# Patient Record
Sex: MALE | Race: BLACK OR AFRICAN AMERICAN | NOT HISPANIC OR LATINO | Employment: FULL TIME | URBAN - METROPOLITAN AREA
[De-identification: names, ages, dates, MRNs, and addresses within clinical notes are randomized per-mention and may not be internally consistent; named-entity substitution may affect disease eponyms.]

---

## 2019-05-03 ENCOUNTER — OFFICE VISIT (OUTPATIENT)
Dept: URGENT CARE | Facility: CLINIC | Age: 44
End: 2019-05-03
Payer: COMMERCIAL

## 2019-05-03 VITALS
OXYGEN SATURATION: 100 % | BODY MASS INDEX: 36.96 KG/M2 | DIASTOLIC BLOOD PRESSURE: 78 MMHG | HEART RATE: 72 BPM | TEMPERATURE: 98 F | SYSTOLIC BLOOD PRESSURE: 124 MMHG | WEIGHT: 230 LBS | HEIGHT: 66 IN

## 2019-05-03 DIAGNOSIS — S69.91XA INJURY OF RIGHT INDEX FINGER, INITIAL ENCOUNTER: Primary | ICD-10-CM

## 2019-05-03 PROCEDURE — 99214 OFFICE O/P EST MOD 30 MIN: CPT | Mod: S$GLB,,, | Performed by: NURSE PRACTITIONER

## 2019-05-03 PROCEDURE — 99214 PR OFFICE/OUTPT VISIT, EST, LEVL IV, 30-39 MIN: ICD-10-PCS | Mod: S$GLB,,, | Performed by: NURSE PRACTITIONER

## 2019-05-03 PROCEDURE — 73130 X-RAY EXAM OF HAND: CPT | Mod: FY,RT,S$GLB, | Performed by: RADIOLOGY

## 2019-05-03 PROCEDURE — 73130 XR HAND COMPLETE 3 VIEW RIGHT: ICD-10-PCS | Mod: FY,RT,S$GLB, | Performed by: RADIOLOGY

## 2019-05-03 PROCEDURE — 3008F BODY MASS INDEX DOCD: CPT | Mod: CPTII,S$GLB,, | Performed by: NURSE PRACTITIONER

## 2019-05-03 PROCEDURE — 3008F PR BODY MASS INDEX (BMI) DOCUMENTED: ICD-10-PCS | Mod: CPTII,S$GLB,, | Performed by: NURSE PRACTITIONER

## 2019-05-03 RX ORDER — METHOCARBAMOL 750 MG/1
TABLET, FILM COATED ORAL
Refills: 0 | COMMUNITY
Start: 2019-03-22

## 2019-05-03 RX ORDER — MELOXICAM 15 MG/1
TABLET ORAL
Refills: 0 | Status: ON HOLD | COMMUNITY
Start: 2019-04-18 | End: 2019-10-23 | Stop reason: HOSPADM

## 2019-05-03 NOTE — LETTER
May 3, 2019      [facility]  [address] [place] [address]  Phone: [phone]  Fax: [phone]       Patient: Mateo George   YOB: 1975  Date of Visit: 05/03/2019    To Whom It May Concern:    Jed George  was at [facility] on 05/03/2019. He may return to work/school on 05/04/19 with no restrictions. If you have any questions or concerns, or if I can be of further assistance, please do not hesitate to contact me.    Sincerely,      Colette June NP

## 2019-05-03 NOTE — PROGRESS NOTES
"Subjective:       Patient ID: Mateo George is a 43 y.o. male.    Vitals:  height is 5' 6" (1.676 m) and weight is 104.3 kg (230 lb). His oral temperature is 98.2 °F (36.8 °C). His blood pressure is 124/78 and his pulse is 72. His oxygen saturation is 100%.     Chief Complaint: Trauma      The patient presents to the clinic today with complaints of a injury to his right index finger.  Patient claims that he slammed in a car door accidentally yesterday.  Patient was at work earlier today when he hit it on a box and experienced pain in the distal tip of the 2nd finger.    Denies any numbness or tingling.  Patient has full range of motion with a moderate amount of pain.    Trauma   The incident occurred 12 to 24 hours ago. The injury mechanism was a direct blow. There is an injury to the right index finger and right long finger. The pain is severe. It is unlikely that a foreign body is present. Associated symptoms include numbness and tingling. Pertinent negatives include no abdominal pain, light-headedness or loss of consciousness. There have been prior injuries to these areas (smashed in car door yest. and then box fell on it at work today. ).       Constitution: Negative for fatigue.   HENT: Negative for facial swelling and facial trauma.    Neck: Negative for neck stiffness.   Cardiovascular: Negative for chest trauma.   Eyes: Negative for eye trauma, double vision and blurred vision.   Gastrointestinal: Negative for abdominal trauma, abdominal pain and rectal bleeding.   Genitourinary: Negative for hematuria, genital trauma and pelvic pain.   Musculoskeletal: Positive for pain and trauma. Negative for joint swelling, abnormal ROM of joint and pain with walking.   Skin: Negative for color change, wound, abrasion and laceration.   Neurological: Positive for numbness. Negative for dizziness, history of vertigo, light-headedness, coordination disturbances, altered mental status and loss of consciousness. "   Hematologic/Lymphatic: Negative for history of bleeding disorder.   Psychiatric/Behavioral: Negative for altered mental status.       Objective:      Physical Exam   Constitutional: He is oriented to person, place, and time. He appears well-developed and well-nourished. He is cooperative.  Non-toxic appearance. He does not appear ill. No distress.   HENT:   Head: Normocephalic and atraumatic. Head is without abrasion, without contusion and without laceration.   Right Ear: Hearing, tympanic membrane, external ear and ear canal normal. No hemotympanum.   Left Ear: Hearing, tympanic membrane, external ear and ear canal normal. No hemotympanum.   Nose: Nose normal. No mucosal edema, rhinorrhea or nasal deformity. No epistaxis. Right sinus exhibits no maxillary sinus tenderness and no frontal sinus tenderness. Left sinus exhibits no maxillary sinus tenderness and no frontal sinus tenderness.   Mouth/Throat: Uvula is midline, oropharynx is clear and moist and mucous membranes are normal. No trismus in the jaw. Normal dentition. No uvula swelling. No posterior oropharyngeal erythema.   Eyes: Pupils are equal, round, and reactive to light. Conjunctivae, EOM and lids are normal. Right eye exhibits no discharge. Left eye exhibits no discharge. No scleral icterus.   Sclera clear bilat   Neck: Trachea normal, normal range of motion, full passive range of motion without pain and phonation normal. Neck supple. No spinous process tenderness and no muscular tenderness present. No neck rigidity. No tracheal deviation present.   Cardiovascular: Normal rate, regular rhythm, normal heart sounds, intact distal pulses and normal pulses.   Pulses:       Radial pulses are 2+ on the right side, and 2+ on the left side.   Pulmonary/Chest: Effort normal and breath sounds normal. No respiratory distress.   Abdominal: Soft. Normal appearance and bowel sounds are normal. He exhibits no distension, no pulsatile midline mass and no mass. There  is no tenderness.   Musculoskeletal: Normal range of motion. He exhibits no edema or deformity.        Right hand: He exhibits tenderness and bony tenderness. He exhibits no deformity.        Hands:  Neurological: He is alert and oriented to person, place, and time. He has normal strength. No cranial nerve deficit or sensory deficit. He exhibits normal muscle tone. He displays no seizure activity. Coordination normal. GCS eye subscore is 4. GCS verbal subscore is 5. GCS motor subscore is 6.   Skin: Skin is warm, dry and intact. Capillary refill takes less than 2 seconds. No abrasion, no bruising, no burn, no ecchymosis and no laceration noted. He is not diaphoretic. No pallor.   Psychiatric: He has a normal mood and affect. His speech is normal and behavior is normal. Judgment and thought content normal. Cognition and memory are normal.   Nursing note and vitals reviewed.        X-ray Hand 3 View Right    Result Date: 5/3/2019  EXAMINATION: XR HAND COMPLETE 3 VIEW RIGHT CLINICAL HISTORY: Unspecified injury of right wrist, hand and finger(s), initial encounter TECHNIQUE: PA, lateral, and oblique views of the right hand were performed. COMPARISON: None FINDINGS: No fracture or dislocation.  No bone destruction identified     See above Electronically signed by: Vin Vega MD Date:    05/03/2019 Time:    10:04    Assessment:       1. Injury of right index finger, initial encounter        Plan:         Injury of right index finger, initial encounter  -     X-Ray Hand 3 View Right; Future; Expected date: 05/03/2019      Patient Instructions     Finger Contusion  You have a contusion. This is also called a bruise. There is swelling and some bleeding under the skin, but no broken bones. This injury generally takes a few days to a few weeks to heal. During that time, the bruise will typically change in color from reddish, to purple-blue, to greenish-yellow, then to yellow-brown.  A finger contusion may be treated with a  splint or buddy tape (taping the injured finger to the one next to it for support). Minor contusions likely will need no other treatment.  Home care  · Elevate the hand to reduce pain and swelling. As much as possible, sit or lie down with the hand raised about the level of your heart. This is especially important during the first 48 hours.  · Ice the finger to help reduce pain and swelling. Wrap a cold source (ice pack or ice cubes in a plastic bag) in a thin towel. Apply to the bruised finger for 20 minutes every 1 to 2 hours the first day. Continue this 3 to 4 times a day until the pain and swelling goes away.  · If buddy tape was applied and it becomes wet or dirty, change it. You may replace it with paper, plastic, or cloth tape. Before taping, put a thin strip of cotton or gauze between the fingers to absorb sweat.  · Unless another medication was prescribed, you can take acetaminophen, ibuprofen, or naproxen to control pain. (If you have chronic liver or kidney disease or ever had a stomach ulcer or GI bleeding, talk with your doctor before using these medicines.)  Follow up  Follow up with your healthcare provider or our staff as advised. Call if you are not improving within 1 to 2 weeks.  When to seek medical advice   Call your healthcare provider right away if you have any of the following:  · Increased pain or swelling  · Hand or arm becomes cold, blue, numb or tingly  · Signs of infection: Warmth, drainage, or increased redness or pain around the bruise  · Inability to move the injured finger or hand   · Frequent bruising for unknown reasons  Date Last Reviewed: 4/29/2015 © 2000-2017 [employer]. [address] [place] [address], [place] [address]. All rights reserved. This information is not intended as a substitute for professional medical care. Always follow your healthcare professional's instructions.      -Xray today is negative.  -Ice to the affected finger.  -Ibuprofen as needed for  pain.  -Wear the finger splint until pain and symptoms improve.  Please follow up with your Primary care provider within 2-5 days if your signs and symptoms have not resolved or worsen.     If your condition worsens or fails to improve we recommend that you receive another evaluation at the emergency room immediately or contact your primary medical clinic to discuss your concerns.   You must understand that you have received an Urgent Care treatment only and that you may be released before all of your medical problems are known or treated. You, the patient, will arrange for follow up care as instructed.

## 2019-05-03 NOTE — PATIENT INSTRUCTIONS
Finger Contusion  You have a contusion. This is also called a bruise. There is swelling and some bleeding under the skin, but no broken bones. This injury generally takes a few days to a few weeks to heal. During that time, the bruise will typically change in color from reddish, to purple-blue, to greenish-yellow, then to yellow-brown.  A finger contusion may be treated with a splint or marleen tape (taping the injured finger to the one next to it for support). Minor contusions likely will need no other treatment.  Home care  · Elevate the hand to reduce pain and swelling. As much as possible, sit or lie down with the hand raised about the level of your heart. This is especially important during the first 48 hours.  · Ice the finger to help reduce pain and swelling. Wrap a cold source (ice pack or ice cubes in a plastic bag) in a thin towel. Apply to the bruised finger for 20 minutes every 1 to 2 hours the first day. Continue this 3 to 4 times a day until the pain and swelling goes away.  · If marleen tape was applied and it becomes wet or dirty, change it. You may replace it with paper, plastic, or cloth tape. Before taping, put a thin strip of cotton or gauze between the fingers to absorb sweat.  · Unless another medication was prescribed, you can take acetaminophen, ibuprofen, or naproxen to control pain. (If you have chronic liver or kidney disease or ever had a stomach ulcer or GI bleeding, talk with your doctor before using these medicines.)  Follow up  Follow up with your healthcare provider or our staff as advised. Call if you are not improving within 1 to 2 weeks.  When to seek medical advice   Call your healthcare provider right away if you have any of the following:  · Increased pain or swelling  · Hand or arm becomes cold, blue, numb or tingly  · Signs of infection: Warmth, drainage, or increased redness or pain around the bruise  · Inability to move the injured finger or hand   · Frequent bruising for  unknown reasons  Date Last Reviewed: 4/29/2015  © 7491-3969 [employer]. [address] [place] [address], [place] [address]. All rights reserved. This information is not intended as a substitute for professional medical care. Always follow your healthcare professional's instructions.      -Xray today is negative.  -Ice to the affected finger.  -Ibuprofen as needed for pain.  -Wear the finger splint until pain and symptoms improve.  Please follow up with your Primary care provider within 2-5 days if your signs and symptoms have not resolved or worsen.     If your condition worsens or fails to improve we recommend that you receive another evaluation at the emergency room immediately or contact your primary medical clinic to discuss your concerns.   You must understand that you have received an Urgent Care treatment only and that you may be released before all of your medical problems are known or treated. You, the patient, will arrange for follow up care as instructed.

## 2019-05-06 ENCOUNTER — TELEPHONE (OUTPATIENT)
Dept: URGENT CARE | Facility: CLINIC | Age: 44
End: 2019-05-06

## 2019-08-03 ENCOUNTER — OFFICE VISIT (OUTPATIENT)
Dept: URGENT CARE | Facility: CLINIC | Age: 44
End: 2019-08-03
Payer: COMMERCIAL

## 2019-08-03 VITALS
TEMPERATURE: 99 F | BODY MASS INDEX: 37.61 KG/M2 | HEIGHT: 66 IN | HEART RATE: 75 BPM | RESPIRATION RATE: 18 BRPM | SYSTOLIC BLOOD PRESSURE: 133 MMHG | DIASTOLIC BLOOD PRESSURE: 90 MMHG | OXYGEN SATURATION: 99 % | WEIGHT: 234 LBS

## 2019-08-03 DIAGNOSIS — K12.0 CANKER SORES ORAL: Primary | ICD-10-CM

## 2019-08-03 PROCEDURE — 99214 PR OFFICE/OUTPT VISIT, EST, LEVL IV, 30-39 MIN: ICD-10-PCS | Mod: S$GLB,,, | Performed by: NURSE PRACTITIONER

## 2019-08-03 PROCEDURE — 99214 OFFICE O/P EST MOD 30 MIN: CPT | Mod: S$GLB,,, | Performed by: NURSE PRACTITIONER

## 2019-08-03 PROCEDURE — 3008F BODY MASS INDEX DOCD: CPT | Mod: CPTII,S$GLB,, | Performed by: NURSE PRACTITIONER

## 2019-08-03 PROCEDURE — 3008F PR BODY MASS INDEX (BMI) DOCUMENTED: ICD-10-PCS | Mod: CPTII,S$GLB,, | Performed by: NURSE PRACTITIONER

## 2019-08-03 RX ORDER — ATORVASTATIN CALCIUM 40 MG/1
TABLET, FILM COATED ORAL
Refills: 0 | COMMUNITY
Start: 2019-07-23

## 2019-08-03 NOTE — PATIENT INSTRUCTIONS
Follow up with Primary care doctor for further evaluation    Tylenol/Ibuprofen as directed for fever and pain    Please return here or go to the Emergency Department for any concerns or worsening of condition.  If you were prescribed antibiotics, please take them to completion.  If you were prescribed a narcotic medication, do not drive or operate heavy equipment or machinery while taking these medications.  Please follow up with your primary care doctor or specialist as needed.

## 2019-08-03 NOTE — PROGRESS NOTES
"Subjective:       Patient ID: Mateo George is a 43 y.o. male.    Vitals:  height is 5' 6" (1.676 m) and weight is 106.1 kg (234 lb). His temperature is 99.2 °F (37.3 °C). His blood pressure is 133/90 (abnormal) and his pulse is 75. His respiration is 18 and oxygen saturation is 99%.     Chief Complaint: Mouth Lesions    Sore on pt's tongue, feels like it is going into his throat, started yesterday    Mouth Lesions    The current episode started yesterday. The onset was sudden. The problem occurs continuously. The problem has been gradually worsening. The problem is moderate. Relieved by: salt water wash, dental pain gel. Associated symptoms include mouth sores. Pertinent negatives include no fever, no double vision, no diarrhea, no nausea, no vomiting, no congestion, no headaches, no sore throat, no cough and no rash.       Constitution: Negative for chills, fatigue and fever.   HENT: Positive for mouth sores. Negative for congestion and sore throat.    Neck: Negative for painful lymph nodes.   Cardiovascular: Negative for chest pain and leg swelling.   Eyes: Negative for double vision and blurred vision.   Respiratory: Negative for cough and shortness of breath.    Gastrointestinal: Negative for nausea, vomiting and diarrhea.   Genitourinary: Negative for dysuria, frequency and urgency.   Musculoskeletal: Negative for joint pain, joint swelling, muscle cramps and muscle ache.   Skin: Negative for color change, pale and rash.   Allergic/Immunologic: Negative for seasonal allergies.   Neurological: Negative for dizziness, history of vertigo, light-headedness, passing out and headaches.   Hematologic/Lymphatic: Negative for swollen lymph nodes, easy bruising/bleeding and history of blood clots. Does not bruise/bleed easily.   Psychiatric/Behavioral: Negative for nervous/anxious, sleep disturbance and depression. The patient is not nervous/anxious.        Objective:      Physical Exam   Constitutional: He is oriented " to person, place, and time. He appears well-developed and well-nourished. He is cooperative.  Non-toxic appearance. He does not appear ill. No distress.   HENT:   Head: Normocephalic and atraumatic.   Right Ear: Hearing, tympanic membrane, external ear and ear canal normal.   Left Ear: Hearing, tympanic membrane, external ear and ear canal normal.   Nose: Nose normal. No mucosal edema, rhinorrhea or nasal deformity. No epistaxis. Right sinus exhibits no maxillary sinus tenderness and no frontal sinus tenderness. Left sinus exhibits no maxillary sinus tenderness and no frontal sinus tenderness.   Mouth/Throat: Uvula is midline, oropharynx is clear and moist and mucous membranes are normal. No trismus in the jaw. Normal dentition. No uvula swelling. No posterior oropharyngeal erythema.       Eyes: Conjunctivae and lids are normal. Right eye exhibits no discharge. Left eye exhibits no discharge. No scleral icterus.   Sclera clear bilat   Neck: Trachea normal, normal range of motion, full passive range of motion without pain and phonation normal. Neck supple.   Cardiovascular: Normal rate, regular rhythm, normal heart sounds, intact distal pulses and normal pulses.   Pulmonary/Chest: Effort normal and breath sounds normal. No respiratory distress.   Abdominal: Soft. Normal appearance and bowel sounds are normal. He exhibits no distension, no pulsatile midline mass and no mass. There is no tenderness.   Musculoskeletal: Normal range of motion. He exhibits no edema or deformity.   Neurological: He is alert and oriented to person, place, and time. He exhibits normal muscle tone. Coordination normal.   Skin: Skin is warm, dry and intact. He is not diaphoretic. No pallor.   Psychiatric: He has a normal mood and affect. His speech is normal and behavior is normal. Judgment and thought content normal. Cognition and memory are normal.   Nursing note and vitals reviewed.      Assessment:       1. Canker sores oral        Plan:          Canker sores oral  -     (Magic mouthwash) 1:1:1 Benadryl 12.5mg/5ml liq, aluminum & magnesium hydroxide-simehticone (Maalox), lidocaine viscous 2%; Swish and spit 10 mLs every 4 (four) hours as needed. for mouth sores  Dispense: 100 mL; Refill: 0    Discussed assessment and treatment with Dr Stone   Follow up with Primary care doctor for further evaluation     Tylenol/Ibuprofen as directed for fever and pain    Please return here or go to the Emergency Department for any concerns or worsening of condition.  If you were prescribed antibiotics, please take them to completion.  If you were prescribed a narcotic medication, do not drive or operate heavy equipment or machinery while taking these medications.  Please follow up with your primary care doctor or specialist as needed.    If you  smoke, please stop smoking.

## 2019-08-03 NOTE — LETTER
August 3, 2019      [facility] [place]  [address]  [place] [address]  Phone: [phone]  Fax: [phone]       Patient: Mateo George   YOB: 1975  Date of Visit: 08/03/2019    To Whom It May Concern:    Jed George  was at [facility] on 08/03/2019. He may return to work/school on 8/4/2019 with no restrictions. If you have any questions or concerns, or if I can be of further assistance, please do not hesitate to contact me.    Sincerely,    Jaymie Ryan, NP

## 2019-10-18 ENCOUNTER — OFFICE VISIT (OUTPATIENT)
Dept: URGENT CARE | Facility: CLINIC | Age: 44
End: 2019-10-18
Payer: COMMERCIAL

## 2019-10-18 VITALS
HEART RATE: 69 BPM | TEMPERATURE: 99 F | BODY MASS INDEX: 36 KG/M2 | OXYGEN SATURATION: 100 % | SYSTOLIC BLOOD PRESSURE: 136 MMHG | DIASTOLIC BLOOD PRESSURE: 79 MMHG | HEIGHT: 66 IN | RESPIRATION RATE: 18 BRPM | WEIGHT: 224 LBS

## 2019-10-18 DIAGNOSIS — R06.02 SHORTNESS OF BREATH: Primary | ICD-10-CM

## 2019-10-18 PROCEDURE — 71046 X-RAY EXAM CHEST 2 VIEWS: CPT | Mod: FY,S$GLB,, | Performed by: RADIOLOGY

## 2019-10-18 PROCEDURE — 71046 XR CHEST PA AND LATERAL: ICD-10-PCS | Mod: FY,S$GLB,, | Performed by: RADIOLOGY

## 2019-10-18 PROCEDURE — 3008F PR BODY MASS INDEX (BMI) DOCUMENTED: ICD-10-PCS | Mod: CPTII,S$GLB,, | Performed by: NURSE PRACTITIONER

## 2019-10-18 PROCEDURE — 3008F BODY MASS INDEX DOCD: CPT | Mod: CPTII,S$GLB,, | Performed by: NURSE PRACTITIONER

## 2019-10-18 PROCEDURE — 99214 OFFICE O/P EST MOD 30 MIN: CPT | Mod: S$GLB,,, | Performed by: NURSE PRACTITIONER

## 2019-10-18 PROCEDURE — 99214 PR OFFICE/OUTPT VISIT, EST, LEVL IV, 30-39 MIN: ICD-10-PCS | Mod: S$GLB,,, | Performed by: NURSE PRACTITIONER

## 2019-10-18 RX ORDER — GUAIFENESIN 600 MG/1
600 TABLET, EXTENDED RELEASE ORAL 2 TIMES DAILY
Qty: 30 TABLET | Refills: 2 | Status: ON HOLD | OUTPATIENT
Start: 2019-10-18 | End: 2019-10-23 | Stop reason: HOSPADM

## 2019-10-18 NOTE — PROGRESS NOTES
"Subjective:       Patient ID: Mateo George is a 43 y.o. male.    Vitals:  height is 5' 6" (1.676 m) and weight is 101.6 kg (224 lb). His temperature is 98.8 °F (37.1 °C). His blood pressure is 136/79 and his pulse is 69. His respiration is 18 and oxygen saturation is 100%.     Chief Complaint: Shortness of Breath    Pt states chest tightness and pressure for 5 days . Pt also had cough. Pt reports pain in his umbilical  are on Monday with blood in his stool, this occurred once    URI    This is a new problem. The problem has been gradually worsening. There has been no fever. Associated symptoms include congestion and coughing. Pertinent negatives include no ear pain, nausea, rash, sinus pain, sore throat, vomiting or wheezing. He has tried nothing for the symptoms.       Constitution: Negative for chills, sweating, fatigue and fever.   HENT: Positive for congestion. Negative for ear pain, sinus pain, sinus pressure, sore throat and voice change.    Neck: Negative for painful lymph nodes.   Eyes: Negative for eye redness.   Respiratory: Positive for chest tightness and cough. Negative for sputum production, bloody sputum, COPD, shortness of breath, stridor, wheezing and asthma.    Gastrointestinal: Negative for nausea and vomiting.   Musculoskeletal: Negative for muscle ache.   Skin: Negative for rash.   Allergic/Immunologic: Negative for seasonal allergies and asthma.   Hematologic/Lymphatic: Negative for swollen lymph nodes.       Objective:      Physical Exam   Constitutional: He is oriented to person, place, and time. He appears well-developed and well-nourished.   HENT:   Head: Normocephalic and atraumatic.   Right Ear: External ear normal.   Left Ear: External ear normal.   Nose: Nose normal.   Mouth/Throat: Oropharynx is clear and moist.   Eyes: Pupils are equal, round, and reactive to light. Conjunctivae and EOM are normal.   Neck: Normal range of motion. Neck supple.   Cardiovascular: Normal rate, regular " "rhythm, normal heart sounds and intact distal pulses.   Pulmonary/Chest: Effort normal and breath sounds normal. No stridor. No respiratory distress. He has no wheezes. He has no rales. He exhibits no tenderness.   Abdominal: Soft. Bowel sounds are normal.   Musculoskeletal: Normal range of motion.   Neurological: He is alert and oriented to person, place, and time.   Skin: Skin is warm and dry. Capillary refill takes less than 2 seconds.   Psychiatric: He has a normal mood and affect. His behavior is normal. Judgment and thought content normal.   Nursing note and vitals reviewed.        Assessment:       1. Shortness of breath        Plan:       Patient Instructions     Viral Syndrome (Adult)  A viral illness may cause a number of symptoms. The symptoms depend on the part of the body that the virus affects. If it settles in your nose, throat, and lungs, it may cause cough, sore throat, congestion, and sometimes headache. If it settles in your stomach and intestinal tract, it may cause vomiting and diarrhea. Sometimes it causes vague symptoms like "aching all over," feeling tired, loss of appetite, or fever.  A viral illness usually lasts 1 to 2 weeks, but sometimes it lasts longer. In some cases, a more serious infection can look like a viral syndrome in the first few days of the illness. You may need another exam and additional tests to know the difference. Watch for the warning signs listed below.  Home care  Follow these guidelines for taking care of yourself at home:  · If symptoms are severe, rest at home for the first 2 to 3 days.  · Stay away from cigarette smoke - both your smoke and the smoke from others.  · You may use over-the-counter acetaminophen or ibuprofen for fever, muscle aching, and headache, unless another medicine was prescribed for this. If you have chronic liver or kidney disease or ever had a stomach ulcer or GI bleeding, talk with your doctor before using these medicines. No one who is " younger than 18 and ill with a fever should take aspirin. It may cause severe disease or death.  · Your appetite may be poor, so a light diet is fine. Avoid dehydration by drinking 8 to 12 8-ounce glasses of fluids each day. This may include water; orange juice; lemonade; apple, grape, and cranberry juice; clear fruit drinks; electrolyte replacement and sports drinks; and decaffeinated teas and coffee. If you have been diagnosed with a kidney disease, ask your doctor how much and what types of fluids you should drink to prevent dehydration. If you have kidney disease, drinking too much fluid can cause it build up in the your body and be dangerous to your health.  · Over-the-counter remedies won't shorten the length of the illness but may be helpful for cough, sore throat; and nasal and sinus congestion. Don't use decongestants if you have high blood pressure.  Follow-up care  Follow up with your healthcare provider if you do not improve over the next week.  Call 911  Get emergency medical care if any of the following occur:  · Convulsion  · Feeling weak, dizzy, or like you are going to faint  · Chest pain, shortness of breath, wheezing, or difficulty breathing  When to seek medical advice  Call your healthcare provider right away if any of these occur:  · Cough with lots of colored sputum (mucus) or blood in your sputum  · Chest pain, shortness of breath, wheezing, or difficulty breathing  · Severe headache; face, neck, or ear pain  · Severe, constant pain in the lower right side of your belly (abdominal)  · Continued vomiting (cant keep liquids down)  · Frequent diarrhea (more than 5 times a day); blood (red or black color) or mucus in diarrhea  · Feeling weak, dizzy, or like you are going to faint  · Extreme thirst  · Fever of 100.4°F (38°C) or higher, or as directed by your healthcare provider  Date Last Reviewed: 9/25/2015  © 6404-9343 The "[employer]. [address] [place] [address], [place] [address]. All  "rights reserved. This information is not intended as a substitute for professional medical care. Always follow your healthcare professional's instructions.            Shortness of breath  -     XR CHEST PA AND LATERAL; Future; Expected date: 10/18/2019  -     guaiFENesin (MUCINEX) 600 mg 12 hr tablet; Take 1 tablet (600 mg total) by mouth 2 (two) times daily.  Dispense: 30 tablet; Refill: 2      Patient Instructions     Viral Syndrome (Adult)  A viral illness may cause a number of symptoms. The symptoms depend on the part of the body that the virus affects. If it settles in your nose, throat, and lungs, it may cause cough, sore throat, congestion, and sometimes headache. If it settles in your stomach and intestinal tract, it may cause vomiting and diarrhea. Sometimes it causes vague symptoms like "aching all over," feeling tired, loss of appetite, or fever.  A viral illness usually lasts 1 to 2 weeks, but sometimes it lasts longer. In some cases, a more serious infection can look like a viral syndrome in the first few days of the illness. You may need another exam and additional tests to know the difference. Watch for the warning signs listed below.  Home care  Follow these guidelines for taking care of yourself at home:  · If symptoms are severe, rest at home for the first 2 to 3 days.  · Stay away from cigarette smoke - both your smoke and the smoke from others.  · You may use over-the-counter acetaminophen or ibuprofen for fever, muscle aching, and headache, unless another medicine was prescribed for this. If you have chronic liver or kidney disease or ever had a stomach ulcer or GI bleeding, talk with your doctor before using these medicines. No one who is younger than 18 and ill with a fever should take aspirin. It may cause severe disease or death.  · Your appetite may be poor, so a light diet is fine. Avoid dehydration by drinking 8 to 12 8-ounce glasses of fluids each day. This may include water; orange " juice; lemonade; apple, grape, and cranberry juice; clear fruit drinks; electrolyte replacement and sports drinks; and decaffeinated teas and coffee. If you have been diagnosed with a kidney disease, ask your doctor how much and what types of fluids you should drink to prevent dehydration. If you have kidney disease, drinking too much fluid can cause it build up in the your body and be dangerous to your health.  · Over-the-counter remedies won't shorten the length of the illness but may be helpful for cough, sore throat; and nasal and sinus congestion. Don't use decongestants if you have high blood pressure.  Follow-up care  Follow up with your healthcare provider if you do not improve over the next week.  Call 911  Get emergency medical care if any of the following occur:  · Convulsion  · Feeling weak, dizzy, or like you are going to faint  · Chest pain, shortness of breath, wheezing, or difficulty breathing  When to seek medical advice  Call your healthcare provider right away if any of these occur:  · Cough with lots of colored sputum (mucus) or blood in your sputum  · Chest pain, shortness of breath, wheezing, or difficulty breathing  · Severe headache; face, neck, or ear pain  · Severe, constant pain in the lower right side of your belly (abdominal)  · Continued vomiting (cant keep liquids down)  · Frequent diarrhea (more than 5 times a day); blood (red or black color) or mucus in diarrhea  · Feeling weak, dizzy, or like you are going to faint  · Extreme thirst  · Fever of 100.4°F (38°C) or higher, or as directed by your healthcare provider  Date Last Reviewed: 9/25/2015  © 9595-3983 [employer]. [address] [place] [address]. All rights reserved. This information is not intended as a substitute for professional medical care. Always follow your healthcare professional's instructions.           Xr Chest Pa And Lateral    Result Date: 10/18/2019  EXAMINATION: XR CHEST PA AND LATERAL  CLINICAL HISTORY: Shortness of breath TECHNIQUE: Frontal and lateral radiographs of the chest. COMPARISON: None FINDINGS: The trachea is unremarkable.  The cardiomediastinal silhouette is within normal limits.  The hemidiaphragms are unremarkable.  There is no evidence of free air beneath the hemidiaphragms.  There are no pleural effusions.  There is no evidence of a pneumothorax.  There is no evidence of pneumomediastinum.  No airspace opacity is present.  The osseous structures are unremarkable.     No acute process. Electronically signed by: Salvador Masters MD Date:    10/18/2019 Time:    18:55

## 2019-10-18 NOTE — PATIENT INSTRUCTIONS
"  Viral Syndrome (Adult)  A viral illness may cause a number of symptoms. The symptoms depend on the part of the body that the virus affects. If it settles in your nose, throat, and lungs, it may cause cough, sore throat, congestion, and sometimes headache. If it settles in your stomach and intestinal tract, it may cause vomiting and diarrhea. Sometimes it causes vague symptoms like "aching all over," feeling tired, loss of appetite, or fever.  A viral illness usually lasts 1 to 2 weeks, but sometimes it lasts longer. In some cases, a more serious infection can look like a viral syndrome in the first few days of the illness. You may need another exam and additional tests to know the difference. Watch for the warning signs listed below.  Home care  Follow these guidelines for taking care of yourself at home:  · If symptoms are severe, rest at home for the first 2 to 3 days.  · Stay away from cigarette smoke - both your smoke and the smoke from others.  · You may use over-the-counter acetaminophen or ibuprofen for fever, muscle aching, and headache, unless another medicine was prescribed for this. If you have chronic liver or kidney disease or ever had a stomach ulcer or GI bleeding, talk with your doctor before using these medicines. No one who is younger than 18 and ill with a fever should take aspirin. It may cause severe disease or death.  · Your appetite may be poor, so a light diet is fine. Avoid dehydration by drinking 8 to 12 8-ounce glasses of fluids each day. This may include water; orange juice; lemonade; apple, grape, and cranberry juice; clear fruit drinks; electrolyte replacement and sports drinks; and decaffeinated teas and coffee. If you have been diagnosed with a kidney disease, ask your doctor how much and what types of fluids you should drink to prevent dehydration. If you have kidney disease, drinking too much fluid can cause it build up in the your body and be dangerous to your " health.  · Over-the-counter remedies won't shorten the length of the illness but may be helpful for cough, sore throat; and nasal and sinus congestion. Don't use decongestants if you have high blood pressure.  Follow-up care  Follow up with your healthcare provider if you do not improve over the next week.  Call 911  Get emergency medical care if any of the following occur:  · Convulsion  · Feeling weak, dizzy, or like you are going to faint  · Chest pain, shortness of breath, wheezing, or difficulty breathing  When to seek medical advice  Call your healthcare provider right away if any of these occur:  · Cough with lots of colored sputum (mucus) or blood in your sputum  · Chest pain, shortness of breath, wheezing, or difficulty breathing  · Severe headache; face, neck, or ear pain  · Severe, constant pain in the lower right side of your belly (abdominal)  · Continued vomiting (cant keep liquids down)  · Frequent diarrhea (more than 5 times a day); blood (red or black color) or mucus in diarrhea  · Feeling weak, dizzy, or like you are going to faint  · Extreme thirst  · Fever of 100.4°F (38°C) or higher, or as directed by your healthcare provider  Date Last Reviewed: 9/25/2015  © 3926-1124 "[employer] [address] [place] [address], [place] [address]. All rights reserved. This information is not intended as a substitute for professional medical care. Always follow your healthcare professional's instructions.

## 2019-10-21 ENCOUNTER — HOSPITAL ENCOUNTER (OUTPATIENT)
Facility: HOSPITAL | Age: 44
Discharge: HOME | End: 2019-10-23
Attending: EMERGENCY MEDICINE | Admitting: HOSPITALIST
Payer: COMMERCIAL

## 2019-10-21 DIAGNOSIS — R11.2 NAUSEA AND VOMITING, INTRACTABILITY OF VOMITING NOT SPECIFIED, UNSPECIFIED VOMITING TYPE: ICD-10-CM

## 2019-10-21 DIAGNOSIS — R11.2 NAUSEA & VOMITING: ICD-10-CM

## 2019-10-21 DIAGNOSIS — R11.10 VOMITING: ICD-10-CM

## 2019-10-21 DIAGNOSIS — R07.89 CHEST PRESSURE: ICD-10-CM

## 2019-10-21 DIAGNOSIS — R11.2 INTRACTABLE VOMITING WITH NAUSEA, UNSPECIFIED VOMITING TYPE: Primary | ICD-10-CM

## 2019-10-21 PROBLEM — E78.2 MIXED HYPERLIPIDEMIA: Status: ACTIVE | Noted: 2019-10-21

## 2019-10-21 PROBLEM — E11.9 TYPE 2 DIABETES MELLITUS: Status: ACTIVE | Noted: 2019-10-21

## 2019-10-21 PROBLEM — I10 ESSENTIAL HYPERTENSION: Status: ACTIVE | Noted: 2019-10-21

## 2019-10-21 PROBLEM — K52.9 GASTROENTERITIS: Status: ACTIVE | Noted: 2019-10-21

## 2019-10-21 LAB
ALBUMIN SERPL BCP-MCNC: 4.7 G/DL (ref 3.5–5.2)
ALP SERPL-CCNC: 142 U/L (ref 55–135)
ALT SERPL W/O P-5'-P-CCNC: 61 U/L (ref 10–44)
AMPHET+METHAMPHET UR QL: NEGATIVE
ANION GAP SERPL CALC-SCNC: 18 MMOL/L (ref 8–16)
AST SERPL-CCNC: 23 U/L (ref 10–40)
B-OH-BUTYR BLD STRIP-SCNC: 2.7 MMOL/L (ref 0–0.5)
BACTERIA #/AREA URNS AUTO: ABNORMAL /HPF
BARBITURATES UR QL SCN>200 NG/ML: NEGATIVE
BASOPHILS # BLD AUTO: 0.03 K/UL (ref 0–0.2)
BASOPHILS NFR BLD: 0.2 % (ref 0–1.9)
BENZODIAZ UR QL SCN>200 NG/ML: NEGATIVE
BILIRUB SERPL-MCNC: 0.8 MG/DL (ref 0.1–1)
BILIRUB UR QL STRIP: NEGATIVE
BUN SERPL-MCNC: 21 MG/DL (ref 6–20)
BZE UR QL SCN: NEGATIVE
CALCIUM SERPL-MCNC: 10.2 MG/DL (ref 8.7–10.5)
CANNABINOIDS UR QL SCN: NORMAL
CHLORIDE SERPL-SCNC: 101 MMOL/L (ref 95–110)
CLARITY UR REFRACT.AUTO: CLEAR
CO2 SERPL-SCNC: 21 MMOL/L (ref 23–29)
COLOR UR AUTO: YELLOW
CREAT SERPL-MCNC: 0.9 MG/DL (ref 0.5–1.4)
CREAT UR-MCNC: 252 MG/DL (ref 23–375)
DIFFERENTIAL METHOD: ABNORMAL
EOSINOPHIL # BLD AUTO: 0 K/UL (ref 0–0.5)
EOSINOPHIL NFR BLD: 0 % (ref 0–8)
ERYTHROCYTE [DISTWIDTH] IN BLOOD BY AUTOMATED COUNT: 14.4 % (ref 11.5–14.5)
EST. GFR  (AFRICAN AMERICAN): >60 ML/MIN/1.73 M^2
EST. GFR  (NON AFRICAN AMERICAN): >60 ML/MIN/1.73 M^2
ETHANOL UR-MCNC: <10 MG/DL
GLUCOSE SERPL-MCNC: 116 MG/DL (ref 70–110)
GLUCOSE UR QL STRIP: NEGATIVE
HCT VFR BLD AUTO: 50.6 % (ref 40–54)
HGB BLD-MCNC: 17.2 G/DL (ref 14–18)
HGB UR QL STRIP: NEGATIVE
HYALINE CASTS UR QL AUTO: 0 /LPF
IMM GRANULOCYTES # BLD AUTO: 0.04 K/UL (ref 0–0.04)
IMM GRANULOCYTES NFR BLD AUTO: 0.3 % (ref 0–0.5)
INFLUENZA A, MOLECULAR: NEGATIVE
INFLUENZA B, MOLECULAR: NEGATIVE
KETONES UR QL STRIP: ABNORMAL
LACTATE SERPL-SCNC: 1.4 MMOL/L (ref 0.5–2.2)
LEUKOCYTE ESTERASE UR QL STRIP: NEGATIVE
LIPASE SERPL-CCNC: 8 U/L (ref 4–60)
LYMPHOCYTES # BLD AUTO: 1.2 K/UL (ref 1–4.8)
LYMPHOCYTES NFR BLD: 7.7 % (ref 18–48)
MCH RBC QN AUTO: 30.2 PG (ref 27–31)
MCHC RBC AUTO-ENTMCNC: 34 G/DL (ref 32–36)
MCV RBC AUTO: 89 FL (ref 82–98)
METHADONE UR QL SCN>300 NG/ML: NEGATIVE
MICROSCOPIC COMMENT: ABNORMAL
MONOCYTES # BLD AUTO: 0.7 K/UL (ref 0.3–1)
MONOCYTES NFR BLD: 4.4 % (ref 4–15)
NEUTROPHILS # BLD AUTO: 13.5 K/UL (ref 1.8–7.7)
NEUTROPHILS NFR BLD: 87.4 % (ref 38–73)
NITRITE UR QL STRIP: NEGATIVE
NRBC BLD-RTO: 0 /100 WBC
OPIATES UR QL SCN: NEGATIVE
PCP UR QL SCN>25 NG/ML: NEGATIVE
PH UR STRIP: 5 [PH] (ref 5–8)
PLATELET # BLD AUTO: 221 K/UL (ref 150–350)
PMV BLD AUTO: 13.1 FL (ref 9.2–12.9)
POCT GLUCOSE: 107 MG/DL (ref 70–110)
POTASSIUM SERPL-SCNC: 3.6 MMOL/L (ref 3.5–5.1)
PROT SERPL-MCNC: 8.1 G/DL (ref 6–8.4)
PROT UR QL STRIP: ABNORMAL
RBC # BLD AUTO: 5.69 M/UL (ref 4.6–6.2)
RBC #/AREA URNS AUTO: 0 /HPF (ref 0–4)
SODIUM SERPL-SCNC: 140 MMOL/L (ref 136–145)
SP GR UR STRIP: 1.03 (ref 1–1.03)
SPECIMEN SOURCE: NORMAL
SQUAMOUS #/AREA URNS AUTO: 1 /HPF
TOXICOLOGY INFORMATION: NORMAL
TROPONIN I SERPL DL<=0.01 NG/ML-MCNC: <0.006 NG/ML (ref 0–0.03)
URN SPEC COLLECT METH UR: ABNORMAL
WBC # BLD AUTO: 15.44 K/UL (ref 3.9–12.7)
WBC #/AREA URNS AUTO: 4 /HPF (ref 0–5)

## 2019-10-21 PROCEDURE — 81001 URINALYSIS AUTO W/SCOPE: CPT | Mod: 59

## 2019-10-21 PROCEDURE — 82010 KETONE BODYS QUAN: CPT

## 2019-10-21 PROCEDURE — 83605 ASSAY OF LACTIC ACID: CPT

## 2019-10-21 PROCEDURE — 96374 THER/PROPH/DIAG INJ IV PUSH: CPT | Mod: 59

## 2019-10-21 PROCEDURE — 93005 ELECTROCARDIOGRAM TRACING: CPT

## 2019-10-21 PROCEDURE — 63600175 PHARM REV CODE 636 W HCPCS: Performed by: NURSE PRACTITIONER

## 2019-10-21 PROCEDURE — 99285 PR EMERGENCY DEPT VISIT,LEVEL V: ICD-10-PCS | Mod: ,,, | Performed by: EMERGENCY MEDICINE

## 2019-10-21 PROCEDURE — 25000003 PHARM REV CODE 250: Performed by: NURSE PRACTITIONER

## 2019-10-21 PROCEDURE — 93010 EKG 12-LEAD: ICD-10-PCS | Mod: ,,, | Performed by: INTERNAL MEDICINE

## 2019-10-21 PROCEDURE — 84484 ASSAY OF TROPONIN QUANT: CPT

## 2019-10-21 PROCEDURE — 83690 ASSAY OF LIPASE: CPT

## 2019-10-21 PROCEDURE — G0378 HOSPITAL OBSERVATION PER HR: HCPCS

## 2019-10-21 PROCEDURE — 99220 PR INITIAL OBSERVATION CARE,LEVL III: CPT | Mod: ,,, | Performed by: NURSE PRACTITIONER

## 2019-10-21 PROCEDURE — 87502 INFLUENZA DNA AMP PROBE: CPT

## 2019-10-21 PROCEDURE — 99285 EMERGENCY DEPT VISIT HI MDM: CPT | Mod: ,,, | Performed by: EMERGENCY MEDICINE

## 2019-10-21 PROCEDURE — 63600175 PHARM REV CODE 636 W HCPCS: Performed by: STUDENT IN AN ORGANIZED HEALTH CARE EDUCATION/TRAINING PROGRAM

## 2019-10-21 PROCEDURE — 63600175 PHARM REV CODE 636 W HCPCS: Performed by: EMERGENCY MEDICINE

## 2019-10-21 PROCEDURE — 99220 PR INITIAL OBSERVATION CARE,LEVL III: ICD-10-PCS | Mod: ,,, | Performed by: NURSE PRACTITIONER

## 2019-10-21 PROCEDURE — 99285 EMERGENCY DEPT VISIT HI MDM: CPT | Mod: 25

## 2019-10-21 PROCEDURE — 80307 DRUG TEST PRSMV CHEM ANLYZR: CPT

## 2019-10-21 PROCEDURE — 96375 TX/PRO/DX INJ NEW DRUG ADDON: CPT

## 2019-10-21 PROCEDURE — 93010 ELECTROCARDIOGRAM REPORT: CPT | Mod: ,,, | Performed by: INTERNAL MEDICINE

## 2019-10-21 PROCEDURE — 80053 COMPREHEN METABOLIC PANEL: CPT

## 2019-10-21 PROCEDURE — 96361 HYDRATE IV INFUSION ADD-ON: CPT

## 2019-10-21 PROCEDURE — 25500020 PHARM REV CODE 255: Performed by: EMERGENCY MEDICINE

## 2019-10-21 PROCEDURE — 85025 COMPLETE CBC W/AUTO DIFF WBC: CPT

## 2019-10-21 RX ORDER — PROMETHAZINE HYDROCHLORIDE 25 MG/1
25 TABLET ORAL EVERY 6 HOURS PRN
Status: DISCONTINUED | OUTPATIENT
Start: 2019-10-21 | End: 2019-10-21

## 2019-10-21 RX ORDER — ONDANSETRON 8 MG/1
8 TABLET, ORALLY DISINTEGRATING ORAL EVERY 8 HOURS PRN
Status: DISCONTINUED | OUTPATIENT
Start: 2019-10-21 | End: 2019-10-22

## 2019-10-21 RX ORDER — GLUCAGON 1 MG
1 KIT INJECTION
Status: DISCONTINUED | OUTPATIENT
Start: 2019-10-21 | End: 2019-10-23 | Stop reason: HOSPADM

## 2019-10-21 RX ORDER — SODIUM CHLORIDE 9 MG/ML
INJECTION, SOLUTION INTRAVENOUS CONTINUOUS
Status: DISCONTINUED | OUTPATIENT
Start: 2019-10-21 | End: 2019-10-23 | Stop reason: HOSPADM

## 2019-10-21 RX ORDER — METHOCARBAMOL 750 MG/1
750 TABLET, FILM COATED ORAL 3 TIMES DAILY PRN
Status: DISCONTINUED | OUTPATIENT
Start: 2019-10-21 | End: 2019-10-23 | Stop reason: HOSPADM

## 2019-10-21 RX ORDER — POLYETHYLENE GLYCOL 3350 17 G/17G
17 POWDER, FOR SOLUTION ORAL 2 TIMES DAILY PRN
Status: DISCONTINUED | OUTPATIENT
Start: 2019-10-21 | End: 2019-10-23 | Stop reason: HOSPADM

## 2019-10-21 RX ORDER — INSULIN ASPART 100 [IU]/ML
0-5 INJECTION, SOLUTION INTRAVENOUS; SUBCUTANEOUS
Status: DISCONTINUED | OUTPATIENT
Start: 2019-10-21 | End: 2019-10-23 | Stop reason: HOSPADM

## 2019-10-21 RX ORDER — LOPERAMIDE HYDROCHLORIDE 2 MG/1
2 CAPSULE ORAL
Status: DISCONTINUED | OUTPATIENT
Start: 2019-10-21 | End: 2019-10-23 | Stop reason: HOSPADM

## 2019-10-21 RX ORDER — ONDANSETRON 2 MG/ML
4 INJECTION INTRAMUSCULAR; INTRAVENOUS
Status: COMPLETED | OUTPATIENT
Start: 2019-10-21 | End: 2019-10-21

## 2019-10-21 RX ORDER — IBUPROFEN 200 MG
16 TABLET ORAL
Status: DISCONTINUED | OUTPATIENT
Start: 2019-10-21 | End: 2019-10-23 | Stop reason: HOSPADM

## 2019-10-21 RX ORDER — NEBIVOLOL 10 MG/1
10 TABLET ORAL DAILY
Status: DISCONTINUED | OUTPATIENT
Start: 2019-10-22 | End: 2019-10-23 | Stop reason: HOSPADM

## 2019-10-21 RX ORDER — SODIUM CHLORIDE 0.9 % (FLUSH) 0.9 %
10 SYRINGE (ML) INJECTION
Status: DISCONTINUED | OUTPATIENT
Start: 2019-10-21 | End: 2019-10-23 | Stop reason: HOSPADM

## 2019-10-21 RX ORDER — ACETAMINOPHEN 325 MG/1
650 TABLET ORAL EVERY 4 HOURS PRN
Status: DISCONTINUED | OUTPATIENT
Start: 2019-10-21 | End: 2019-10-23 | Stop reason: HOSPADM

## 2019-10-21 RX ORDER — TRAMADOL HYDROCHLORIDE 50 MG/1
50 TABLET ORAL EVERY 6 HOURS PRN
Status: DISCONTINUED | OUTPATIENT
Start: 2019-10-21 | End: 2019-10-23 | Stop reason: HOSPADM

## 2019-10-21 RX ORDER — ATORVASTATIN CALCIUM 20 MG/1
40 TABLET, FILM COATED ORAL NIGHTLY
Status: DISCONTINUED | OUTPATIENT
Start: 2019-10-21 | End: 2019-10-23 | Stop reason: HOSPADM

## 2019-10-21 RX ORDER — ONDANSETRON 2 MG/ML
4 INJECTION INTRAMUSCULAR; INTRAVENOUS EVERY 8 HOURS PRN
Status: DISCONTINUED | OUTPATIENT
Start: 2019-10-21 | End: 2019-10-21

## 2019-10-21 RX ORDER — IBUPROFEN 200 MG
24 TABLET ORAL
Status: DISCONTINUED | OUTPATIENT
Start: 2019-10-21 | End: 2019-10-23 | Stop reason: HOSPADM

## 2019-10-21 RX ORDER — PROMETHAZINE HYDROCHLORIDE 25 MG/1
25 SUPPOSITORY RECTAL EVERY 6 HOURS PRN
Status: DISCONTINUED | OUTPATIENT
Start: 2019-10-21 | End: 2019-10-21

## 2019-10-21 RX ORDER — RAMELTEON 8 MG/1
8 TABLET ORAL NIGHTLY PRN
Status: DISCONTINUED | OUTPATIENT
Start: 2019-10-21 | End: 2019-10-23 | Stop reason: HOSPADM

## 2019-10-21 RX ORDER — AMLODIPINE BESYLATE 10 MG/1
10 TABLET ORAL DAILY
Status: DISCONTINUED | OUTPATIENT
Start: 2019-10-22 | End: 2019-10-23 | Stop reason: HOSPADM

## 2019-10-21 RX ORDER — ENOXAPARIN SODIUM 100 MG/ML
40 INJECTION SUBCUTANEOUS EVERY 24 HOURS
Status: DISCONTINUED | OUTPATIENT
Start: 2019-10-21 | End: 2019-10-23 | Stop reason: HOSPADM

## 2019-10-21 RX ORDER — BISACODYL 10 MG
10 SUPPOSITORY, RECTAL RECTAL DAILY PRN
Status: DISCONTINUED | OUTPATIENT
Start: 2019-10-21 | End: 2019-10-23 | Stop reason: HOSPADM

## 2019-10-21 RX ADMIN — ONDANSETRON 4 MG: 2 INJECTION INTRAMUSCULAR; INTRAVENOUS at 10:10

## 2019-10-21 RX ADMIN — SODIUM CHLORIDE 1000 ML: 0.9 INJECTION, SOLUTION INTRAVENOUS at 05:10

## 2019-10-21 RX ADMIN — ONDANSETRON 4 MG: 2 INJECTION INTRAMUSCULAR; INTRAVENOUS at 06:10

## 2019-10-21 RX ADMIN — ATORVASTATIN CALCIUM 40 MG: 20 TABLET, FILM COATED ORAL at 09:10

## 2019-10-21 RX ADMIN — ENOXAPARIN SODIUM 40 MG: 100 INJECTION SUBCUTANEOUS at 06:10

## 2019-10-21 RX ADMIN — PROMETHAZINE HYDROCHLORIDE 25 MG: 25 INJECTION INTRAMUSCULAR; INTRAVENOUS at 09:10

## 2019-10-21 RX ADMIN — IOHEXOL 100 ML: 350 INJECTION, SOLUTION INTRAVENOUS at 02:10

## 2019-10-21 RX ADMIN — SODIUM CHLORIDE, SODIUM LACTATE, POTASSIUM CHLORIDE, AND CALCIUM CHLORIDE 1000 ML: .6; .31; .03; .02 INJECTION, SOLUTION INTRAVENOUS at 02:10

## 2019-10-21 RX ADMIN — PROMETHAZINE HYDROCHLORIDE 12.5 MG: 25 INJECTION INTRAMUSCULAR; INTRAVENOUS at 03:10

## 2019-10-21 RX ADMIN — PROMETHAZINE HYDROCHLORIDE 25 MG: 25 TABLET ORAL at 06:10

## 2019-10-21 RX ADMIN — SODIUM CHLORIDE, SODIUM LACTATE, POTASSIUM CHLORIDE, AND CALCIUM CHLORIDE 1000 ML: .6; .31; .03; .02 INJECTION, SOLUTION INTRAVENOUS at 10:10

## 2019-10-21 NOTE — ED PROVIDER NOTES
Encounter Date: 10/21/2019    SCRIBE #1 NOTE: I, Lizet Solitario, am scribing for, and in the presence of,  Dr. Donis. I have scribed the following portions of the note - the Resident attestation and the EKG reading.       History     Chief Complaint   Patient presents with    Emesis     Pt c/o vomiting x 2 days.  Pt also reports one episode of blood in toilet with brown stool last week.      Patient is a 43-yr-old male with PMH of HTN, DM, and HLD presenting with 1 day history of N/V and lightheadedness. States he have URI symptoms of sore throat, decreased appetite, chest tightness and a cough starting on Monday 10/14 and went to urgent care where he received Mucinex. The Mucinex improved his chest tightness but he developed persistent N/V yesterday with no PO intake since Saturday 10/19 at noon. Limited water intake. No sick contacts with similar symptoms or recent travel. Denies blood in emesis, fevers/chills, trauma, abdominal pain, hx of abdominal surgeries or insulin use. No history of tobacco use or EtOH use.         Review of patient's allergies indicates:  No Known Allergies  Past Medical History:   Diagnosis Date    Hyperlipidemia     Hypertension     Prediabetes      History reviewed. No pertinent surgical history.  History reviewed. No pertinent family history.  Social History     Tobacco Use    Smoking status: Former Smoker     Types: Vaping with nicotine   Substance Use Topics    Alcohol use: Yes     Comment: 1xweekly    Drug use: Not Currently     Types: Marijuana     Review of Systems   Constitutional: Positive for activity change, appetite change and fatigue. Negative for chills and fever.   HENT: Positive for congestion and sore throat. Negative for nosebleeds and trouble swallowing.    Eyes: Negative for photophobia, pain and redness.   Respiratory: Positive for cough and shortness of breath. Negative for chest tightness.    Cardiovascular: Positive for palpitations. Negative for  chest pain.   Gastrointestinal: Positive for constipation, nausea and vomiting. Negative for abdominal pain and diarrhea.   Genitourinary: Negative for dysuria and hematuria.   Musculoskeletal: Negative for back pain and neck pain.   Skin: Negative for rash and wound.   Neurological: Positive for light-headedness. Negative for numbness and headaches.       Physical Exam     Initial Vitals [10/21/19 0841]   BP Pulse Resp Temp SpO2   132/81 101 16 99.8 °F (37.7 °C) 100 %      MAP       --         Physical Exam    Nursing note and vitals reviewed.  Constitutional: He appears well-developed and well-nourished. He is not diaphoretic.   HENT:   Head: Normocephalic and atraumatic.   Right Ear: External ear normal.   Left Ear: External ear normal.   Nose: Nose normal.   Mouth/Throat: No oropharyngeal exudate.   TM clear bilaterally, dry MM with no tonsillar exudate. No lymphadenopathy.    Eyes: Conjunctivae and EOM are normal. Pupils are equal, round, and reactive to light. Right eye exhibits no discharge. Left eye exhibits no discharge. No scleral icterus.   Neck: Normal range of motion. Neck supple. No JVD present.   Cardiovascular: Normal rate, regular rhythm, normal heart sounds and intact distal pulses. Exam reveals no friction rub.    No murmur heard.  Pulmonary/Chest: No stridor. No respiratory distress. He has no wheezes. He has no rales. He exhibits no tenderness.   Abdominal: Soft. He exhibits no distension. There is no tenderness. There is no rebound and no guarding.   Abdomen soft, non distended and nontender with bulging umbilicus. No CVA tenderness. Hyperactive bowel sounds.    Musculoskeletal: Normal range of motion. He exhibits no edema.   Neurological: He is alert and oriented to person, place, and time. GCS score is 15. GCS eye subscore is 4. GCS verbal subscore is 5. GCS motor subscore is 6.   Skin: Skin is warm and dry. Capillary refill takes less than 2 seconds. No erythema. No pallor.   Psychiatric: He  has a normal mood and affect.         ED Course   Procedures  Labs Reviewed   CBC W/ AUTO DIFFERENTIAL - Abnormal; Notable for the following components:       Result Value    WBC 15.44 (*)     MPV 13.1 (*)     Gran # (ANC) 13.5 (*)     Gran% 87.4 (*)     Lymph% 7.7 (*)     All other components within normal limits   URINALYSIS, REFLEX TO URINE CULTURE - Abnormal; Notable for the following components:    Protein, UA 1+ (*)     Ketones, UA 3+ (*)     All other components within normal limits    Narrative:     Preferred Collection Type->Urine, Clean Catch   BETA - HYDROXYBUTYRATE, SERUM - Abnormal; Notable for the following components:    Beta-Hydroxybutyrate 2.7 (*)     All other components within normal limits   URINALYSIS MICROSCOPIC - Abnormal; Notable for the following components:    Hyaline Casts, UA 0. (*)     All other components within normal limits    Narrative:     Preferred Collection Type->Urine, Clean Catch   COMPREHENSIVE METABOLIC PANEL - Abnormal; Notable for the following components:    CO2 21 (*)     Glucose 116 (*)     BUN, Bld 21 (*)     Alkaline Phosphatase 142 (*)     ALT 61 (*)     Anion Gap 18 (*)     All other components within normal limits    Narrative:     add on [id number] per Dr. Donis  10/21/2019  11:50 Leticia   INFLUENZA A & B BY MOLECULAR   TROPONIN I   LIPASE    Narrative:     add on [id number] per Dr. Donis  10/21/2019  11:50 TElizabeth   TROPONIN I    Narrative:     add on [id number] per Dr. Donis  10/21/2019  11:50 Leticia   LACTIC ACID, PLASMA     EKG Readings: (Independently Interpreted)   Initial Reading: No STEMI. Rhythm: Normal Sinus Rhythm. Heart Rate: 76.   , QRS 92,      ECG Results          EKG 12-lead (Final result)  Result time 10/21/19 11:31:25    Final result by Interface, Lab In [facility] (10/21/19 11:31:25)                 Narrative:    Test Reason : R11.10,    Vent. Rate : 076 BPM     Atrial Rate : 076 BPM     P-R Int : 132 ms           QRS Dur : 092 ms      QT Int : 380 ms       P-R-T Axes : 069 081 053 degrees     QTc Int : 427 ms    Normal sinus rhythm  Normal ECG  When compared with ECG of 18-OCT-2019 18:14,  No significant change was found  Confirmed by LINDA MORA MD ([id number]) on 10/21/2019 11:31:13 AM    Referred By: [email or web address]   SELF           Confirmed By:LINDA MORA MD                            Imaging Results          CT Abdomen Pelvis With Contrast (Final result)  Result time 10/21/19 14:40:06    Final result by Wild Alves MD (10/21/19 14:40:06)                 Impression:      1. Findings suggesting hepatic steatosis, correlation with LFTs recommended.  2. Bilateral perinephric fat stranding, nonspecific.  Correlation with urinalysis recommended.  3. Additional findings above.      Electronically signed by: Wild Alves MD  Date:    10/21/2019  Time:    14:40             Narrative:    EXAMINATION:  CT ABDOMEN PELVIS WITH CONTRAST    CLINICAL HISTORY:  N/V luekocytosis, evaluate for infection;    TECHNIQUE:  Low dose axial images, sagittal and coronal reformations were obtained from the lung bases to the pubic symphysis following the IV administration of 100 mL of Omnipaque 350 .  Oral contrast was not given.    COMPARISON:  None.    FINDINGS:  Images of the lower thorax are unremarkable.    The liver is hypoattenuating, suggesting steatosis, correlation with LFTs recommended.  The spleen, pancreas, gallbladder and adrenal glands are grossly unremarkable.  There is no biliary dilation or ascites.  There is liquid content within the gastric lumen, no gastric wall thickening.  The portal vein, splenic vein, SMV, celiac axis and SMA all are patent.  No significant abdominal lymphadenopathy.    The kidneys enhance symmetrically without hydronephrosis or nephrolithiasis.  The kidneys excrete contrast appropriately on delayed imaging.  There is minimal bilateral perinephric fat stranding, left greater than right.  The  bilateral ureters are unremarkable without calculi seen.  The urinary bladder is unremarkable.  The prostate is not enlarged.    There are a few scattered colonic diverticula without inflammation.  The terminal ileum and appendix are unremarkable.  The small bowel is grossly unremarkable.  No focal organized pelvic fluid collection.    There are bilateral pars defects at L5 without significant listhesis.  No significant inguinal lymphadenopathy.                                 Medical Decision Making:   History:   Old Medical Records: I decided to obtain old medical records.  Old Records Summarized: records from clinic visits.       <> Summary of Records: Patient seen last week for URI symptoms and diagnosed with a viral syndrome. Prior history of ED visit for blood in stool that did not require hospital admission.   Initial Assessment:   Patient is a 43-yr-old man with PMH of T2MD, HTN, and HLD presenting with 1 day of nonbloody emesis and decreased PO intake with no fevers/chills or abdominal pain.   Differential Diagnosis:   Ddx includes but not limited to viral gastroenteritis following URI, influenza, DKA (though less likely with no abdominal pain/diarrhea, or insulin dependence), colitis, cholecystitis, pancreatitis, and early appendicitis. With soft abdomen and history, concern for viral syndrome with possible electrolyte abnormalities. Labs, zofran, and IVF ordered. Will get flu swab and also check for anion gap/betahydroxybutric acid for DKA work-up. With no abdominal pain or tenderness on exam, imaging not indicated at this time. Will re-evaluate after medications and labs.   Independently Interpreted Test(s):   I have ordered and independently interpreted EKG Reading(s) - see prior notes  Clinical Tests:   Lab Tests: Ordered and Reviewed  Medical Tests: Ordered and Reviewed  ED Management:  PGY-2 Update:  Labs show leukocytosis with elevated beta hydroxybutyric acid. CMP pending to assess for AG. Fluids  still running. EKG independently interpreted by me, showed normal sinus rhythm with no ALISHA/STD or contiguous T-wave inversion. Normal intervals on EKG. Concern for ischemia low based on findings but troponin still pending. Patient stated nausea improved after zofran. Will continue to monitor and keep NPO while labs pending then consider PO challenge if concern for surgical process remains low.   10:42 AM    PGY-2 Update:  Initial CMP and lipase hemolysed and repeated. Bedside FOBT performed with RN, showing external small hemorrhoid with no thrombosis. No internal hemorrhoids. No melena or bright red blood per rectum and FOBT negative for blood. Fluids still running. No episodes of emesis in ED. Will continue to monitor.  11:27 AM    PGY-2 Update:  Patient has completed fluids. Glucose is less than 150 with AG of 18. Though betahydroxybutric acid is up, with normal glucose, unlikely to be DKA. Normal lipase, negative troponin, and normal EKG. Continues to generally feel week. Concern for viral illness based on history, clinical exam, and labs though early undiagnosed early infectious abdominal process also a possibility. Nausea returning. With leukocytosis and worsening symptoms over 1 week, will consider expanding work-up.  12:25 PM    PGY-2 Update:  CT abdomen/pelvis ordered to evaluate for an early infectious process such as appendicitis with leukocytosis. Given a 2nd liter of fluids and will assess for elevated lactate. Plan to discuss case with IM team if normal imaging given nonspecific elevated labs (luekocytosis and beta-hydroxybutric acid).   1:06 PM    PGY-2 Update:  CT ab/pelvis showed fat stranding around kidneys and enlarged liver but patient has normal AST and mild elevation in ALT and UA not concerning for infection making infectious process of these organs seem less likely. Continues to have nausea and gagging at bedside, despite zofran and now phenergan. He looks uncomfortable and flushed. Given  inability to achieve symptom control for PO challenge, will consult for observation.  3:40 PM    PGY-2 Update:  Patient will be observed by Dr. Chairez's team. Patient aware of plan. Pending placement in observation unit.  4:11 PM                Scribe Attestation:   Scribe #1: I performed the above scribed service and the documentation accurately describes the services I performed. I attest to the accuracy of the note.    Attending Attestation:   Physician Attestation Statement for Resident:  As the supervising MD   Physician Attestation Statement: I have personally seen and examined this patient.   I agree with the above history. -: Differential diagnosis includes the above, will add troponin and EKG as patient endorses left upper chest pain for several days, although viral syndrome is mostly likely diagnosis.      As the supervising MD I agree with the above PE.   -: Fatigued but in no distress, dry mucous membranes, no lymphadenopathy, no abdominal or chest wall tenderness to palpation.    As the supervising MD I agree with the above treatment, course, plan, and disposition.   -:     I have reviewed and agree with the residents interpretation of the following: x-rays, CT scans, lab data and EKG.  I have reviewed the following: old records at this facility and records from a referring facility.                       Clinical Impression:       ICD-10-CM ICD-9-CM   1. Intractable vomiting with nausea, unspecified vomiting type R11.2 536.2   2. Chest pressure R07.89 786.59   3. Vomiting R11.10 787.03   4. Nausea & vomiting R11.2 787.01   5. Nausea and vomiting, intractability of vomiting not specified, unspecified vomiting type R11.2 787.01                                Lillian Munroe MD  Resident  10/21/19 1613       Suyapa Donis MD  10/22/19 2167

## 2019-10-21 NOTE — ED NOTES
Pt transported to room 3086 via wheel chair with escort Pt condition stable on transport, pt belongings are with pt and wife has been informed of room number

## 2019-10-21 NOTE — ED NOTES
Patient identifiers verified and correct for Mateo George  LOC: The patient is awake, alert and aware of environment with an appropriate affect, the patient is oriented x 3 and speaking appropriately.   APPEARANCE: Patient appears comfortable and in no acute distress, patient is clean and well groomed.  SKIN: The skin is warm and dry, color consistent with ethnicity, patient has normal skin turgor and moist mucus membranes, skin intact, no breakdown or bruising noted.   MUSCULOSKELETAL: Patient moving all extremities spontaneously, no swelling noted.  RESPIRATORY: Airway is open and patent, respirations are spontaneous, patient has a normal effort and rate, no accessory muscle use noted, pt placed on continuous pulse ox with O2 sats noted at 97% on room air.  CARDIAC: Pt placed on cardiac monitor. Patient has a tachy rate and regular rhythm, no edema noted, capillary refill < 3 seconds.   GASTRO: Soft and non tender to palpation, no distention noted, normoactive bowel sounds present in all four quadrants. Pt states bowel movements have been regular with one episode of blood in stool. Pt reports nausea with emesis greater than 20 episodes since Saturday.  : Pt denies any pain or frequency with urination.  NEURO: Pt opens eyes spontaneously, behavior appropriate to situation, follows commands, facial expression symmetrical, bilateral hand grasp equal and even, purposeful motor response noted, normal sensation in all extremities when touched with a finger.

## 2019-10-21 NOTE — ED TRIAGE NOTES
Pt states vomiting since yesterday morning x20+. Pt denies blood in the vomit. Pt states one episode of bloody bowel movement which resolved.

## 2019-10-22 LAB
25(OH)D3+25(OH)D2 SERPL-MCNC: 31 NG/ML (ref 30–96)
ALP SERPL-CCNC: 119 U/L (ref 55–135)
ALT SERPL W/O P-5'-P-CCNC: 48 U/L (ref 10–44)
ANION GAP SERPL CALC-SCNC: 12 MMOL/L (ref 8–16)
AST SERPL-CCNC: 22 U/L (ref 10–40)
B-OH-BUTYR BLD STRIP-SCNC: 1.8 MMOL/L (ref 0–0.5)
BASOPHILS # BLD AUTO: 0.06 K/UL (ref 0–0.2)
BASOPHILS NFR BLD: 0.3 % (ref 0–1.9)
BILIRUB SERPL-MCNC: 0.8 MG/DL (ref 0.1–1)
BUN SERPL-MCNC: 14 MG/DL (ref 6–20)
CALCIUM SERPL-MCNC: 9.2 MG/DL (ref 8.7–10.5)
CHLORIDE SERPL-SCNC: 101 MMOL/L (ref 95–110)
CHOLEST SERPL-MCNC: 79 MG/DL (ref 120–199)
CHOLEST/HDLC SERPL: 2.6 {RATIO} (ref 2–5)
CO2 SERPL-SCNC: 24 MMOL/L (ref 23–29)
CREAT SERPL-MCNC: 0.8 MG/DL (ref 0.5–1.4)
DIFFERENTIAL METHOD: ABNORMAL
EOSINOPHIL # BLD AUTO: 0 K/UL (ref 0–0.5)
EOSINOPHIL NFR BLD: 0.1 % (ref 0–8)
ERYTHROCYTE [DISTWIDTH] IN BLOOD BY AUTOMATED COUNT: 12.6 % (ref 11.5–14.5)
EST. GFR  (AFRICAN AMERICAN): >60 ML/MIN/1.73 M^2
EST. GFR  (NON AFRICAN AMERICAN): >60 ML/MIN/1.73 M^2
ESTIMATED AVG GLUCOSE: 114 MG/DL (ref 68–131)
GLUCOSE SERPL-MCNC: 103 MG/DL (ref 70–110)
HAV IGM SERPL QL IA: NEGATIVE
HBA1C MFR BLD HPLC: 5.6 % (ref 4–5.6)
HBV CORE IGM SERPL QL IA: NEGATIVE
HBV SURFACE AG SERPL QL IA: NEGATIVE
HCT VFR BLD AUTO: 45.8 % (ref 40–54)
HCV AB SERPL QL IA: NEGATIVE
HDLC SERPL-MCNC: 30 MG/DL (ref 40–75)
HDLC SERPL: 38 % (ref 20–50)
HGB BLD-MCNC: 14.9 G/DL (ref 14–18)
IMM GRANULOCYTES # BLD AUTO: 0.07 K/UL (ref 0–0.04)
IMM GRANULOCYTES NFR BLD AUTO: 0.4 % (ref 0–0.5)
LDLC SERPL CALC-MCNC: 32.6 MG/DL (ref 63–159)
LIPASE SERPL-CCNC: 10 U/L (ref 4–60)
LYMPHOCYTES # BLD AUTO: 2 K/UL (ref 1–4.8)
LYMPHOCYTES NFR BLD: 10.3 % (ref 18–48)
MAGNESIUM SERPL-MCNC: 1.9 MG/DL (ref 1.6–2.6)
MCH RBC QN AUTO: 29.9 PG (ref 27–31)
MCHC RBC AUTO-ENTMCNC: 32.5 G/DL (ref 32–36)
MCV RBC AUTO: 92 FL (ref 82–98)
MONOCYTES # BLD AUTO: 1.9 K/UL (ref 0.3–1)
MONOCYTES NFR BLD: 9.7 % (ref 4–15)
NEUTROPHILS # BLD AUTO: 15.3 K/UL (ref 1.8–7.7)
NEUTROPHILS NFR BLD: 79.2 % (ref 38–73)
NONHDLC SERPL-MCNC: 49 MG/DL
NRBC BLD-RTO: 0 /100 WBC
PLATELET # BLD AUTO: 158 K/UL (ref 150–350)
PMV BLD AUTO: 12.4 FL (ref 9.2–12.9)
POCT GLUCOSE: 82 MG/DL (ref 70–110)
POCT GLUCOSE: 84 MG/DL (ref 70–110)
POCT GLUCOSE: 89 MG/DL (ref 70–110)
POCT GLUCOSE: 92 MG/DL (ref 70–110)
POTASSIUM SERPL-SCNC: 3.5 MMOL/L (ref 3.5–5.1)
RBC # BLD AUTO: 4.98 M/UL (ref 4.6–6.2)
SODIUM SERPL-SCNC: 137 MMOL/L (ref 136–145)
TRIGL SERPL-MCNC: 82 MG/DL (ref 30–150)
WBC # BLD AUTO: 19.32 K/UL (ref 3.9–12.7)

## 2019-10-22 PROCEDURE — 84075 ASSAY ALKALINE PHOSPHATASE: CPT

## 2019-10-22 PROCEDURE — 80074 ACUTE HEPATITIS PANEL: CPT

## 2019-10-22 PROCEDURE — 84450 TRANSFERASE (AST) (SGOT): CPT

## 2019-10-22 PROCEDURE — G0378 HOSPITAL OBSERVATION PER HR: HCPCS

## 2019-10-22 PROCEDURE — 25000003 PHARM REV CODE 250: Performed by: NURSE PRACTITIONER

## 2019-10-22 PROCEDURE — 85025 COMPLETE CBC W/AUTO DIFF WBC: CPT

## 2019-10-22 PROCEDURE — 83036 HEMOGLOBIN GLYCOSYLATED A1C: CPT

## 2019-10-22 PROCEDURE — 63600175 PHARM REV CODE 636 W HCPCS: Performed by: NURSE PRACTITIONER

## 2019-10-22 PROCEDURE — 83690 ASSAY OF LIPASE: CPT

## 2019-10-22 PROCEDURE — 80048 BASIC METABOLIC PNL TOTAL CA: CPT

## 2019-10-22 PROCEDURE — 80061 LIPID PANEL: CPT

## 2019-10-22 PROCEDURE — 63600175 PHARM REV CODE 636 W HCPCS: Performed by: PHYSICIAN ASSISTANT

## 2019-10-22 PROCEDURE — 82247 BILIRUBIN TOTAL: CPT

## 2019-10-22 PROCEDURE — 36415 COLL VENOUS BLD VENIPUNCTURE: CPT

## 2019-10-22 PROCEDURE — 82010 KETONE BODYS QUAN: CPT

## 2019-10-22 PROCEDURE — 99226 PR SUBSEQUENT OBSERVATION CARE,LEVEL III: CPT | Mod: ,,, | Performed by: PHYSICIAN ASSISTANT

## 2019-10-22 PROCEDURE — 83735 ASSAY OF MAGNESIUM: CPT

## 2019-10-22 PROCEDURE — 82306 VITAMIN D 25 HYDROXY: CPT

## 2019-10-22 PROCEDURE — 84460 ALANINE AMINO (ALT) (SGPT): CPT

## 2019-10-22 PROCEDURE — 99226 PR SUBSEQUENT OBSERVATION CARE,LEVEL III: ICD-10-PCS | Mod: ,,, | Performed by: PHYSICIAN ASSISTANT

## 2019-10-22 RX ORDER — ONDANSETRON 2 MG/ML
8 INJECTION INTRAMUSCULAR; INTRAVENOUS EVERY 6 HOURS PRN
Status: DISCONTINUED | OUTPATIENT
Start: 2019-10-22 | End: 2019-10-23 | Stop reason: HOSPADM

## 2019-10-22 RX ORDER — METOCLOPRAMIDE HYDROCHLORIDE 5 MG/ML
10 INJECTION INTRAMUSCULAR; INTRAVENOUS ONCE
Status: COMPLETED | OUTPATIENT
Start: 2019-10-22 | End: 2019-10-22

## 2019-10-22 RX ORDER — ALUMINUM HYDROXIDE, MAGNESIUM HYDROXIDE, AND SIMETHICONE 2400; 240; 2400 MG/30ML; MG/30ML; MG/30ML
30 SUSPENSION ORAL EVERY 6 HOURS PRN
Status: DISCONTINUED | OUTPATIENT
Start: 2019-10-23 | End: 2019-10-23

## 2019-10-22 RX ORDER — METOCLOPRAMIDE HYDROCHLORIDE 5 MG/ML
10 INJECTION INTRAMUSCULAR; INTRAVENOUS EVERY 6 HOURS PRN
Status: DISCONTINUED | OUTPATIENT
Start: 2019-10-23 | End: 2019-10-23 | Stop reason: HOSPADM

## 2019-10-22 RX ADMIN — ONDANSETRON 8 MG: 2 INJECTION INTRAMUSCULAR; INTRAVENOUS at 11:10

## 2019-10-22 RX ADMIN — ALUMINUM HYDROXIDE, MAGNESIUM HYDROXIDE, AND DIMETHICONE 30 ML: 400; 400; 40 SUSPENSION ORAL at 11:10

## 2019-10-22 RX ADMIN — ONDANSETRON 8 MG: 8 TABLET, ORALLY DISINTEGRATING ORAL at 03:10

## 2019-10-22 RX ADMIN — ATORVASTATIN CALCIUM 40 MG: 20 TABLET, FILM COATED ORAL at 08:10

## 2019-10-22 RX ADMIN — ONDANSETRON 8 MG: 8 TABLET, ORALLY DISINTEGRATING ORAL at 10:10

## 2019-10-22 RX ADMIN — ENOXAPARIN SODIUM 40 MG: 100 INJECTION SUBCUTANEOUS at 04:10

## 2019-10-22 RX ADMIN — SODIUM CHLORIDE: 0.9 INJECTION, SOLUTION INTRAVENOUS at 03:10

## 2019-10-22 RX ADMIN — PROMETHAZINE HYDROCHLORIDE 25 MG: 25 INJECTION INTRAMUSCULAR; INTRAVENOUS at 07:10

## 2019-10-22 RX ADMIN — METOCLOPRAMIDE 10 MG: 5 INJECTION, SOLUTION INTRAMUSCULAR; INTRAVENOUS at 12:10

## 2019-10-22 RX ADMIN — ACETAMINOPHEN 650 MG: 325 TABLET ORAL at 03:10

## 2019-10-22 RX ADMIN — SODIUM CHLORIDE: 0.9 INJECTION, SOLUTION INTRAVENOUS at 07:10

## 2019-10-22 RX ADMIN — PROMETHAZINE HYDROCHLORIDE 25 MG: 25 INJECTION INTRAMUSCULAR; INTRAVENOUS at 08:10

## 2019-10-22 RX ADMIN — METOCLOPRAMIDE 10 MG: 5 INJECTION, SOLUTION INTRAMUSCULAR; INTRAVENOUS at 11:10

## 2019-10-22 NOTE — ASSESSMENT & PLAN NOTE
-chronic, stable  -continue home amlodipine and bystolic  -dose/medication adjustment as appropriate   -further monitoring and management per PCP

## 2019-10-22 NOTE — HPI
Mr. George is a 43 YOM with PMHx of HTN, HLD, and type II DM (on oral agents). He presented to the ED with complaints of N/V and light headiness. He also reports recent sore throat, decreased appetite, chest tightness, and cough that started a week ago and he was seen in Urgent Care where he received mucinex. He does note the mucinex improved chest tightness but has since developed N/V. He reports decreased appetite and PO intake. He denied bloody emesis, hematuria, blood in stools, fever, chills, abdominal pain, chest pain, shortness of breath, or recent sick contacts. He denies GI surgical procedures in the past. He lives with spouse and is independent in ADLs. He denies tobacco, alcohol, or illicit drug use.     In the ED: CBC with WBC of 15 otherwise unremarkable, chemistry with CO2 21 and AG 18 with normal glucose, LFTs unremarkable, lipase WNL, troponin negative, lactic WNL, beta-hydrox 2.7, flu negative, UA negative, CT A/P without acute pathology, and EKG NSR without dynamic changes. In the ED he was hydrated with IVFs and given anti-emetics however nausea has not resolved. The patient was placed in observation to the Hospital Medicine Service for further evaluation and treatment.

## 2019-10-22 NOTE — PLAN OF CARE
Pt resting in bed with eyes closed. Has had no emesis this shift but has had nausea and dry heaves. In no apparent distress. No safety issues this shift

## 2019-10-22 NOTE — ASSESSMENT & PLAN NOTE
-likely viral gastroenteritis   -in the ED CBC with WBC of 15 otherwise unremarkable, chemistry with CO2 21 and AG 18 with normal glucose, LFTs unremarkable, lipase WNL, troponin negative, lactic WNL, beta-hydrox 2.7, flu negative, UA negative, CT A/P without acute pathology, and EKG NSR without dynamic changes  -continue IVF hydration (initiated in ED)  -advance diet as tolerated  -PRN supportive care with anti-emetics  -monitor for improvement in WBCs if not will need further diagnostic evaluation>> currently no infectious etiology identified  -close monitoring of glucose  -monitor AM labs: CBC, BMP, mag, lipid panel, HgA1C, and beta hydrox  -will need PCP follow up

## 2019-10-22 NOTE — SUBJECTIVE & OBJECTIVE
Interval History:  no acute events overnight.  No improvement in N/V.  Discussed marijuana use and pt reports once daily use which is decreased from use in the past.    Review of Systems   Constitutional: Positive for appetite change. Negative for activity change, chills, diaphoresis, fatigue, fever and unexpected weight change.   HENT: Negative for congestion, sore throat and trouble swallowing.    Respiratory: Positive for cough and chest tightness. Negative for shortness of breath and wheezing.    Cardiovascular: Negative for chest pain, palpitations and leg swelling.   Gastrointestinal: Positive for abdominal pain, nausea and vomiting. Negative for abdominal distention, constipation and diarrhea.   Endocrine: Negative.    Genitourinary: Negative for decreased urine volume, difficulty urinating, dysuria, hematuria and urgency.   Musculoskeletal: Negative for arthralgias, back pain, gait problem, joint swelling and myalgias.   Skin: Negative for rash and wound.   Allergic/Immunologic: Negative.    Neurological: Positive for light-headedness. Negative for dizziness, syncope and weakness.   Hematological: Negative.    Psychiatric/Behavioral: Negative.      Objective:     Vital Signs (Most Recent):  Temp: 98.6 °F (37 °C) (10/22/19 0722)  Pulse: 84 (10/22/19 0722)  Resp: 18 (10/22/19 0722)  BP: (!) 142/70 (10/22/19 0722)  SpO2: 98 % (10/22/19 0722) Vital Signs (24h Range):  Temp:  [98.5 °F (36.9 °C)-100.2 °F (37.9 °C)] 98.6 °F (37 °C)  Pulse:  [76-84] 84  Resp:  [16-20] 18  SpO2:  [94 %-100 %] 98 %  BP: (141-162)/(68-79) 142/70     Weight: 99 kg (218 lb 4.1 oz)  Body mass index is 35.23 kg/m².    Intake/Output Summary (Last 24 hours) at 10/22/2019 1130  Last data filed at 10/22/2019 0900  Gross per 24 hour   Intake 2220 ml   Output 2 ml   Net 2218 ml      Physical Exam   Constitutional: He is oriented to person, place, and time. He appears well-developed and well-nourished. No distress.   HENT:   Head: Normocephalic  and atraumatic.   Mouth/Throat: Mucous membranes are normal. Normal dentition.   Eyes: Conjunctivae, EOM and lids are normal.   Neck: Normal range of motion. Neck supple.   Cardiovascular: Normal rate, regular rhythm and normal heart sounds.   Pulmonary/Chest: Effort normal and breath sounds normal. He exhibits no tenderness.   Comfortable on room air. No conversational dyspnea.   Abdominal: Soft. Bowel sounds are normal. He exhibits no distension. There is tenderness in the epigastric area.   Musculoskeletal: Normal range of motion. He exhibits no edema or deformity.   Neurological: He is alert and oriented to person, place, and time. No cranial nerve deficit.   Skin: Skin is warm and dry. No rash noted. He is not diaphoretic. No erythema.   Psychiatric: He has a normal mood and affect. Judgment and thought content normal.   Nursing note and vitals reviewed.    Significant Labs: All pertinent labs within the past 24 hours have been reviewed.    Significant Imaging: I have reviewed all pertinent imaging results/findings within the past 24 hours.

## 2019-10-22 NOTE — ASSESSMENT & PLAN NOTE
-chronic  -HgA1C pending in AM  -repeat BMP and beta hydrox pending in AM  -glucose results normal so far, no evidence of DKA  -hold home metformin  -low dose SSI inpatient as needed  -monitor accuchecks AC/HS and PRN hypoglycemic protocol   -further monitoring and management per PCP

## 2019-10-22 NOTE — PROGRESS NOTES
[facility] Hospital Medicine  Progress Note    Patient Name: Mateo George  MRN: [id number]  Patient Class: OP- Observation   Admission Date: 10/21/2019  Length of Stay: 0 days  Attending Physician: SILVIA Pérez MD  Primary Care Provider: Primary Doctor [facility] Medicine Team: [facility] Y Jacqui Vidal PA-C    Subjective:     Principal Problem:Nausea & vomiting        HPI:  Mr. George is a 43 YOM with PMHx of HTN, HLD, and type II DM (on oral agents). He presented to the ED with complaints of N/V and light headiness. He also reports recent sore throat, decreased appetite, chest tightness, and cough that started a week ago and he was seen in Urgent Care where he received mucinex. He does note the mucinex improved chest tightness but has since developed N/V. He reports decreased appetite and PO intake. He denied bloody emesis, hematuria, blood in stools, fever, chills, abdominal pain, chest pain, shortness of breath, or recent sick contacts. He denies GI surgical procedures in the past. He lives with spouse and is independent in ADLs. He denies tobacco, alcohol, or illicit drug use.     In the ED: CBC with WBC of 15 otherwise unremarkable, chemistry with CO2 21 and AG 18 with normal glucose, LFTs unremarkable, lipase WNL, troponin negative, lactic WNL, beta-hydrox 2.7, flu negative, UA negative, CT A/P without acute pathology, and EKG NSR without dynamic changes. In the ED he was hydrated with IVFs and given anti-emetics however nausea has not resolved. The patient was placed in observation to the Hospital Medicine Service for further evaluation and treatment.     Overview/Hospital Course:  Pt placed in observation for intractable N/V.  WBC 15.44->19.32, lactic acid 1.4.  Alk Phos 142->119, ALT 61->48.  CT abdomen with contrast findings suggesting hepatic steatosis, correlation with LFTs recommended.  Bilateral perinephric fat stranding, nonspecific. Correlation with urinalysis  recommended.  Drug screen positive for marijuana, pt reports daily use however no use in ~1 week.  Hepatitis panel ordered.  US ordered to rule out acute cholecystitis.      Interval History:  no acute events overnight.  No improvement in N/V.  Discussed marijuana use and pt reports once daily use which is decreased from use in the past.    Review of Systems   Constitutional: Positive for appetite change. Negative for activity change, chills, diaphoresis, fatigue, fever and unexpected weight change.   HENT: Negative for congestion, sore throat and trouble swallowing.    Respiratory: Positive for cough and chest tightness. Negative for shortness of breath and wheezing.    Cardiovascular: Negative for chest pain, palpitations and leg swelling.   Gastrointestinal: Positive for abdominal pain, nausea and vomiting. Negative for abdominal distention, constipation and diarrhea.   Endocrine: Negative.    Genitourinary: Negative for decreased urine volume, difficulty urinating, dysuria, hematuria and urgency.   Musculoskeletal: Negative for arthralgias, back pain, gait problem, joint swelling and myalgias.   Skin: Negative for rash and wound.   Allergic/Immunologic: Negative.    Neurological: Positive for light-headedness. Negative for dizziness, syncope and weakness.   Hematological: Negative.    Psychiatric/Behavioral: Negative.      Objective:     Vital Signs (Most Recent):  Temp: 98.6 °F (37 °C) (10/22/19 0722)  Pulse: 84 (10/22/19 0722)  Resp: 18 (10/22/19 0722)  BP: (!) 142/70 (10/22/19 0722)  SpO2: 98 % (10/22/19 0722) Vital Signs (24h Range):  Temp:  [98.5 °F (36.9 °C)-100.2 °F (37.9 °C)] 98.6 °F (37 °C)  Pulse:  [76-84] 84  Resp:  [16-20] 18  SpO2:  [94 %-100 %] 98 %  BP: (141-162)/(68-79) 142/70     Weight: 99 kg (218 lb 4.1 oz)  Body mass index is 35.23 kg/m².    Intake/Output Summary (Last 24 hours) at 10/22/2019 1130  Last data filed at 10/22/2019 0900  Gross per 24 hour   Intake 2220 ml   Output 2 ml   Net 9838  ml      Physical Exam   Constitutional: He is oriented to person, place, and time. He appears well-developed and well-nourished. No distress.   HENT:   Head: Normocephalic and atraumatic.   Mouth/Throat: Mucous membranes are normal. Normal dentition.   Eyes: Conjunctivae, EOM and lids are normal.   Neck: Normal range of motion. Neck supple.   Cardiovascular: Normal rate, regular rhythm and normal heart sounds.   Pulmonary/Chest: Effort normal and breath sounds normal. He exhibits no tenderness.   Comfortable on room air. No conversational dyspnea.   Abdominal: Soft. Bowel sounds are normal. He exhibits no distension. There is tenderness in the epigastric area.   Musculoskeletal: Normal range of motion. He exhibits no edema or deformity.   Neurological: He is alert and oriented to person, place, and time. No cranial nerve deficit.   Skin: Skin is warm and dry. No rash noted. He is not diaphoretic. No erythema.   Psychiatric: He has a normal mood and affect. Judgment and thought content normal.   Nursing note and vitals reviewed.    Significant Labs: All pertinent labs within the past 24 hours have been reviewed.    Significant Imaging: I have reviewed all pertinent imaging results/findings within the past 24 hours.      Assessment/Plan:      * Nausea & vomiting  -likely viral gastroenteritis   -in the ED CBC with WBC of 15 otherwise unremarkable, chemistry with CO2 21 and AG 18 with normal glucose, LFTs unremarkable, lipase WNL, troponin negative, lactic WNL, beta-hydrox 2.7, flu negative, UA negative, CT A/P without acute pathology, and EKG NSR without dynamic changes  -continue IVF hydration (initiated in ED)  -advance diet as tolerated  -PRN supportive care with anti-emetics  -WBC increased and low grade fever.  US abdomen.  -monitor AM labs: CBC, BMP, mag, lipid panel, HgA1C, and beta hydrox  -will need PCP follow up     Gastroenteritis  -see above     Type 2 diabetes mellitus  -chronic  -HgA1C 5.6  -repeat BMP  and beta hydrox pending in AM->improved  -hold home metformin  -low dose SSI inpatient as needed  -monitor accuchecks AC/HS and PRN hypoglycemic protocol   -further monitoring and management per PCP     Essential hypertension  -chronic, stable  -continue home amlodipine and bystolic  -dose/medication adjustment as appropriate   -further monitoring and management per PCP     Mixed hyperlipidemia  -chronic  -lipid panel low HDL  -continue statin (LFTs WNL, no evidence of pancreatitis)      VTE Risk Mitigation (From admission, onward)         Ordered     enoxaparin injection 40 mg  Daily      10/21/19 1639     Place sequential compression device  Until discontinued      10/21/19 1639     Place NELLI hose  Until discontinued      10/21/19 1637     Place sequential compression device  Until discontinued      10/21/19 1637     IP VTE HIGH RISK PATIENT  Once      10/21/19 1639                      Jacqui Vidal PA-C  Department of Hospital Medicine   [facility]

## 2019-10-22 NOTE — PLAN OF CARE
PCP   Maira Santiago MD  [address] [place] [address]  [phone]      [id number] - [place] - [address]  [address] [place] [address]  Phone: [phone] Fax: [phone]    [id number] - [place], [place] - [address]  [address]  [place] [address]  Phone: [phone] Fax: [phone]    Payor: BLUE CROSS BLUE SHIELD / Plan: BCBS ALL OUT OF STATE / Product Type: PPO /        10/22/19 1130   Discharge Assessment   Assessment Type Discharge Planning Assessment   Confirmed/corrected address and phone number on facesheet? Yes   Assessment information obtained from? Patient   Expected Length of Stay (days) 3   Communicated expected length of stay with patient/caregiver yes   Prior to hospitilization cognitive status: Alert/Oriented   Prior to hospitalization functional status: Independent   Current cognitive status: Alert/Oriented   Current Functional Status: Independent   Lives With spouse   Able to Return to Prior Arrangements yes   Is patient able to care for self after discharge? Yes   Who are your caregiver(s) and their phone number(s)?   (Ale George  spouse [phone] )   Patient's perception of discharge disposition home or selfcare   Patient currently receives any other outside agency services? No   Equipment Currently Used at Home none   Do you have any problems affording any of your prescribed medications? TBD   Does the patient have transportation home? Yes   Transportation Anticipated family or friend will provide   Discharge Plan A Home with family   Discharge Plan B Home with family   DME Needed Upon Discharge  glucometer   Patient/Family in Agreement with Plan yes

## 2019-10-22 NOTE — H&P
[facility] Hospital Medicine  History & Physical    Patient Name: Mateo George  MRN: [id number]  Admission Date: 10/21/2019  Attending Physician: Ebony Chairez MD   Primary Care Provider: Primary Doctor [facility] Medicine Team: [facility] Y Fanny Vora NP     Patient information was obtained from patient, spouse/SO, past medical records and ER records.     Subjective:     Principal Problem:Nausea & vomiting    Chief Complaint:   Chief Complaint   Patient presents with    Emesis     Pt c/o vomiting x 2 days.  Pt also reports one episode of blood in toilet with brown stool last week.       HPI: Mr. George is a 43 YOM with PMHx of HTN, HLD, and type II DM (on oral agents). He presented to the ED with complaints of N/V and light headiness. He also reports recent sore throat, decreased appetite, chest tightness, and cough that started a week ago and he was seen in Urgent Care where he received mucinex. He does note the mucinex improved chest tightness but has since developed N/V. He reports decreased appetite and PO intake. He denied bloody emesis, hematuria, blood in stools, fever, chills, abdominal pain, chest pain, shortness of breath, or recent sick contacts. He denies GI surgical procedures in the past. He lives with spouse and is independent in ADLs. He denies tobacco, alcohol, or illicit drug use.     In the ED: CBC with WBC of 15 otherwise unremarkable, chemistry with CO2 21 and AG 18 with normal glucose, LFTs unremarkable, lipase WNL, troponin negative, lactic WNL, beta-hydrox 2.7, flu negative, UA negative, CT A/P without acute pathology, and EKG NSR without dynamic changes. In the ED he was hydrated with IVFs and given anti-emetics however nausea has not resolved. The patient was placed in observation to the Hospital Medicine Service for further evaluation and treatment.     Past Medical History:   Diagnosis Date    Hyperlipidemia     Hypertension     Prediabetes         History reviewed. No pertinent surgical history.    Review of patient's allergies indicates:  No Known Allergies    No current facility-administered medications on file prior to encounter.      Current Outpatient Medications on File Prior to Encounter   Medication Sig    amlodipine (NORVASC) 10 MG tablet Take 10 mg by mouth once daily.    meloxicam (MOBIC) 15 MG tablet TAKE BY MOUTH 1 TABLET DAILY AS NEEDED    metformin (GLUCOPHAGE) 1000 MG tablet Take 1,000 mg by mouth daily with breakfast.    nebivolol (BYSTOLIC) 5 MG Tab Take 10 mg by mouth once daily.    (Magic mouthwash) 1:1:1 Benadryl 12.5mg/5ml liq, aluminum & magnesium hydroxide-simehticone (Maalox), lidocaine viscous 2% Swish and spit 10 mLs every 4 (four) hours as needed. for mouth sores (Patient not taking: Reported on 10/18/2019)    atorvastatin (LIPITOR) 40 MG tablet TAKE BY MOUTH 1 TABLET AT BEDTIME    guaiFENesin (MUCINEX) 600 mg 12 hr tablet Take 1 tablet (600 mg total) by mouth 2 (two) times daily.    methocarbamol (ROBAXIN) 750 MG Tab TAKE BY MOUTH 1 TABLET 3 TIMES A DAY AS NEEDED    ondansetron (ZOFRAN-ODT) 4 MG TbDL Take 1 tablet (4 mg total) by mouth every 8 (eight) hours as needed.     Family History     None        Tobacco Use    Smoking status: Former Smoker     Types: Vaping with nicotine   Substance and Sexual Activity    Alcohol use: Yes     Comment: 1xweekly    Drug use: Not Currently     Types: Marijuana    Sexual activity: Not on file     Review of Systems   Constitutional: Positive for appetite change. Negative for activity change, chills, diaphoresis, fatigue, fever and unexpected weight change.   HENT: Positive for sore throat. Negative for congestion and trouble swallowing.    Eyes: Negative for photophobia and visual disturbance.   Respiratory: Positive for cough and chest tightness. Negative for shortness of breath and wheezing.    Cardiovascular: Negative for chest pain, palpitations and leg swelling.    Gastrointestinal: Positive for nausea and vomiting. Negative for abdominal distention, constipation and diarrhea.   Endocrine: Negative.    Genitourinary: Negative for decreased urine volume, difficulty urinating, dysuria, hematuria and urgency.   Musculoskeletal: Negative for arthralgias, back pain, gait problem, joint swelling and myalgias.   Skin: Negative for rash and wound.   Allergic/Immunologic: Negative.    Neurological: Positive for light-headedness. Negative for dizziness, syncope and weakness.   Hematological: Negative.    Psychiatric/Behavioral: Negative.      Objective:     Vital Signs (Most Recent):  Temp: 100.2 °F (37.9 °C) (10/21/19 1743)  Pulse: 80 (10/21/19 1743)  Resp: 16 (10/21/19 1743)  BP: (!) 162/74 (10/21/19 1743)  SpO2: 96 % (10/21/19 1743) Vital Signs (24h Range):  Temp:  [99 °F (37.2 °C)-100.2 °F (37.9 °C)] 100.2 °F (37.9 °C)  Pulse:  [] 80  Resp:  [16-20] 16  SpO2:  [96 %-100 %] 96 %  BP: (132-162)/(68-81) 162/74     Weight: 99.3 kg (219 lb)  Body mass index is 35.35 kg/m².    Physical Exam   Constitutional: He is oriented to person, place, and time. He appears well-developed and well-nourished. No distress.   Adult male, resting in recliner, cold compress to forehead with emesis basin at bedside.   HENT:   Head: Normocephalic and atraumatic.   Mouth/Throat: Mucous membranes are normal. Normal dentition.   Eyes: Conjunctivae, EOM and lids are normal.   Neck: Normal range of motion. Neck supple.   Cardiovascular: Normal rate, regular rhythm and normal heart sounds.   Pulmonary/Chest: Effort normal and breath sounds normal. He exhibits no tenderness.   Comfortable on room air. No conversational dyspnea.   Abdominal: Soft. Bowel sounds are normal. He exhibits no distension. There is no tenderness.   Musculoskeletal: Normal range of motion. He exhibits no edema or deformity.   Neurological: He is alert and oriented to person, place, and time. No cranial nerve deficit.   Skin: Skin is  warm and dry. No rash noted. He is not diaphoretic. No erythema.   Psychiatric: He has a normal mood and affect. Judgment and thought content normal.   Nursing note and vitals reviewed.        CRANIAL NERVES     CN III, IV, VI   Extraocular motions are normal.     Significant Labs:   CBC:   Recent Labs   Lab 10/21/19  0938   WBC 15.44*   HGB 17.2   HCT 50.6        CMP:   Recent Labs   Lab 10/21/19  1114      K 3.6      CO2 21*   *   BUN 21*   CREATININE 0.9   CALCIUM 10.2   PROT 8.1   ALBUMIN 4.7   BILITOT 0.8   ALKPHOS 142*   AST 23   ALT 61*   ANIONGAP 18*   EGFRNONAA >60.0     Cardiac Markers: No results for input(s): CKMB, MYOGLOBIN, BNP, TROPISTAT in the last 48 hours.  Lactic Acid:   Recent Labs   Lab 10/21/19  1429   LACTATE 1.4     Lipase:   Recent Labs   Lab 10/21/19  1114   LIPASE 8     Magnesium: No results for input(s): MG in the last 48 hours.  Troponin:   Recent Labs   Lab 10/21/19  1114   TROPONINI <0.006       Significant Imaging: I have reviewed all pertinent imaging results/findings within the past 24 hours.    Assessment/Plan:     * Nausea & vomiting  -likely viral gastroenteritis   -in the ED CBC with WBC of 15 otherwise unremarkable, chemistry with CO2 21 and AG 18 with normal glucose, LFTs unremarkable, lipase WNL, troponin negative, lactic WNL, beta-hydrox 2.7, flu negative, UA negative, CT A/P without acute pathology, and EKG NSR without dynamic changes  -continue IVF hydration (initiated in ED)  -advance diet as tolerated  -PRN supportive care with anti-emetics  -monitor for improvement in WBCs if not will need further diagnostic evaluation>> currently no infectious etiology identified  -close monitoring of glucose  -monitor AM labs: CBC, BMP, mag, lipid panel, HgA1C, and beta hydrox  -will need PCP follow up     Gastroenteritis  -see above     Type 2 diabetes mellitus  -chronic  -HgA1C pending in AM  -repeat BMP and beta hydrox pending in AM  -glucose results  normal so far, no evidence of DKA  -hold home metformin  -low dose SSI inpatient as needed  -monitor accuchecks AC/HS and PRN hypoglycemic protocol   -further monitoring and management per PCP     Essential hypertension  -chronic, stable  -continue home amlodipine and bystolic  -dose/medication adjustment as appropriate   -further monitoring and management per PCP     Mixed hyperlipidemia  -chronic  -lipid panel pending in AM  -continue statin (LFTs WNL, no evidence of pancreatitis)      VTE Risk Mitigation (From admission, onward)         Ordered     enoxaparin injection 40 mg  Daily      10/21/19 1639     Place sequential compression device  Until discontinued      10/21/19 1639     Place NELLI hose  Until discontinued      10/21/19 1637     Place sequential compression device  Until discontinued      10/21/19 1637     IP VTE HIGH RISK PATIENT  Once      10/21/19 1639                Fanny Vora, DNP, AG-ACNP, BC  Department of Hospital Medicine  [facility]  Pager [phone]  [facility] [id number]

## 2019-10-22 NOTE — ASSESSMENT & PLAN NOTE
-chronic  -HgA1C 5.6  -repeat BMP and beta hydrox pending in AM->improved  -hold home metformin  -low dose SSI inpatient as needed  -monitor accuchecks AC/HS and PRN hypoglycemic protocol   -further monitoring and management per PCP

## 2019-10-22 NOTE — HOSPITAL COURSE
Pt placed in observation for intractable N/V.  WBC 15.44->19.32->15.71, lactic acid 1.4.  Alk Phos 142->119->113, ALT 61->48->38.  CT abdomen with contrast findings suggesting hepatic steatosis, correlation with LFTs recommended.  Bilateral perinephric fat stranding, nonspecific. Correlation with urinalysis recommended.  Drug screen positive for marijuana, pt reports daily use however no use in ~1 week.  Hepatitis panel negative.  US ordered for further evaluation of epigastric/RUQ pain and unremarkable.  Day of discharge pt tolerating clear liquids, no vomiting.  Discharged with prn antiemetics.

## 2019-10-22 NOTE — PROGRESS NOTES
Patient had felt nauseated, given IV zofran. 10 minutes later checked, patient had emesis episode moderate amount of brown/maroon colored, given phenergan. Notified physician on call for med Y. No new orders

## 2019-10-22 NOTE — SUBJECTIVE & OBJECTIVE
Past Medical History:   Diagnosis Date    Hyperlipidemia     Hypertension     Prediabetes        History reviewed. No pertinent surgical history.    Review of patient's allergies indicates:  No Known Allergies    No current facility-administered medications on file prior to encounter.      Current Outpatient Medications on File Prior to Encounter   Medication Sig    amlodipine (NORVASC) 10 MG tablet Take 10 mg by mouth once daily.    meloxicam (MOBIC) 15 MG tablet TAKE BY MOUTH 1 TABLET DAILY AS NEEDED    metformin (GLUCOPHAGE) 1000 MG tablet Take 1,000 mg by mouth daily with breakfast.    nebivolol (BYSTOLIC) 5 MG Tab Take 10 mg by mouth once daily.    (Magic mouthwash) 1:1:1 Benadryl 12.5mg/5ml liq, aluminum & magnesium hydroxide-simehticone (Maalox), lidocaine viscous 2% Swish and spit 10 mLs every 4 (four) hours as needed. for mouth sores (Patient not taking: Reported on 10/18/2019)    atorvastatin (LIPITOR) 40 MG tablet TAKE BY MOUTH 1 TABLET AT BEDTIME    guaiFENesin (MUCINEX) 600 mg 12 hr tablet Take 1 tablet (600 mg total) by mouth 2 (two) times daily.    methocarbamol (ROBAXIN) 750 MG Tab TAKE BY MOUTH 1 TABLET 3 TIMES A DAY AS NEEDED    ondansetron (ZOFRAN-ODT) 4 MG TbDL Take 1 tablet (4 mg total) by mouth every 8 (eight) hours as needed.     Family History     None        Tobacco Use    Smoking status: Former Smoker     Types: Vaping with nicotine   Substance and Sexual Activity    Alcohol use: Yes     Comment: 1xweekly    Drug use: Not Currently     Types: Marijuana    Sexual activity: Not on file     Review of Systems   Constitutional: Positive for appetite change. Negative for activity change, chills, diaphoresis, fatigue, fever and unexpected weight change.   HENT: Positive for sore throat. Negative for congestion and trouble swallowing.    Eyes: Negative for photophobia and visual disturbance.   Respiratory: Positive for cough and chest tightness. Negative for shortness of breath and  wheezing.    Cardiovascular: Negative for chest pain, palpitations and leg swelling.   Gastrointestinal: Positive for nausea and vomiting. Negative for abdominal distention, constipation and diarrhea.   Endocrine: Negative.    Genitourinary: Negative for decreased urine volume, difficulty urinating, dysuria, hematuria and urgency.   Musculoskeletal: Negative for arthralgias, back pain, gait problem, joint swelling and myalgias.   Skin: Negative for rash and wound.   Allergic/Immunologic: Negative.    Neurological: Positive for light-headedness. Negative for dizziness, syncope and weakness.   Hematological: Negative.    Psychiatric/Behavioral: Negative.      Objective:     Vital Signs (Most Recent):  Temp: 100.2 °F (37.9 °C) (10/21/19 1743)  Pulse: 80 (10/21/19 1743)  Resp: 16 (10/21/19 1743)  BP: (!) 162/74 (10/21/19 1743)  SpO2: 96 % (10/21/19 1743) Vital Signs (24h Range):  Temp:  [99 °F (37.2 °C)-100.2 °F (37.9 °C)] 100.2 °F (37.9 °C)  Pulse:  [] 80  Resp:  [16-20] 16  SpO2:  [96 %-100 %] 96 %  BP: (132-162)/(68-81) 162/74     Weight: 99.3 kg (219 lb)  Body mass index is 35.35 kg/m².    Physical Exam   Constitutional: He is oriented to person, place, and time. He appears well-developed and well-nourished. No distress.   Adult male, resting in recliner, cold compress to forehead with emesis basin at bedside.   HENT:   Head: Normocephalic and atraumatic.   Mouth/Throat: Mucous membranes are normal. Normal dentition.   Eyes: Conjunctivae, EOM and lids are normal.   Neck: Normal range of motion. Neck supple.   Cardiovascular: Normal rate, regular rhythm and normal heart sounds.   Pulmonary/Chest: Effort normal and breath sounds normal. He exhibits no tenderness.   Comfortable on room air. No conversational dyspnea.   Abdominal: Soft. Bowel sounds are normal. He exhibits no distension. There is no tenderness.   Musculoskeletal: Normal range of motion. He exhibits no edema or deformity.   Neurological: He is  alert and oriented to person, place, and time. No cranial nerve deficit.   Skin: Skin is warm and dry. No rash noted. He is not diaphoretic. No erythema.   Psychiatric: He has a normal mood and affect. Judgment and thought content normal.   Nursing note and vitals reviewed.        CRANIAL NERVES     CN III, IV, VI   Extraocular motions are normal.     Significant Labs:   CBC:   Recent Labs   Lab 10/21/19  0938   WBC 15.44*   HGB 17.2   HCT 50.6        CMP:   Recent Labs   Lab 10/21/19  1114      K 3.6      CO2 21*   *   BUN 21*   CREATININE 0.9   CALCIUM 10.2   PROT 8.1   ALBUMIN 4.7   BILITOT 0.8   ALKPHOS 142*   AST 23   ALT 61*   ANIONGAP 18*   EGFRNONAA >60.0     Cardiac Markers: No results for input(s): CKMB, MYOGLOBIN, BNP, TROPISTAT in the last 48 hours.  Lactic Acid:   Recent Labs   Lab 10/21/19  1429   LACTATE 1.4     Lipase:   Recent Labs   Lab 10/21/19  1114   LIPASE 8     Magnesium: No results for input(s): MG in the last 48 hours.  Troponin:   Recent Labs   Lab 10/21/19  1114   TROPONINI <0.006       Significant Imaging: I have reviewed all pertinent imaging results/findings within the past 24 hours.

## 2019-10-22 NOTE — PLAN OF CARE
Patient had 1 episode of emesis this shift. Given reglan IV. Nausea was getting better. Given tylenol for side pain on the left. Resolved. Patient  Is remaining NPO until US of abdomen with doppler

## 2019-10-22 NOTE — ASSESSMENT & PLAN NOTE
-likely viral gastroenteritis   -in the ED CBC with WBC of 15 otherwise unremarkable, chemistry with CO2 21 and AG 18 with normal glucose, LFTs unremarkable, lipase WNL, troponin negative, lactic WNL, beta-hydrox 2.7, flu negative, UA negative, CT A/P without acute pathology, and EKG NSR without dynamic changes  -continue IVF hydration (initiated in ED)  -advance diet as tolerated  -PRN supportive care with anti-emetics  -WBC increased and low grade fever.  US abdomen.  -monitor AM labs: CBC, BMP, mag, lipid panel, HgA1C, and beta hydrox  -will need PCP follow up

## 2019-10-23 VITALS
BODY MASS INDEX: 35.08 KG/M2 | HEART RATE: 76 BPM | HEIGHT: 66 IN | OXYGEN SATURATION: 95 % | RESPIRATION RATE: 16 BRPM | DIASTOLIC BLOOD PRESSURE: 77 MMHG | TEMPERATURE: 99 F | WEIGHT: 218.25 LBS | SYSTOLIC BLOOD PRESSURE: 138 MMHG

## 2019-10-23 PROBLEM — R11.2 NAUSEA & VOMITING: Status: RESOLVED | Noted: 2019-10-21 | Resolved: 2019-10-23

## 2019-10-23 LAB
ALBUMIN SERPL BCP-MCNC: 3.6 G/DL (ref 3.5–5.2)
ALP SERPL-CCNC: 113 U/L (ref 55–135)
ALT SERPL W/O P-5'-P-CCNC: 38 U/L (ref 10–44)
ANION GAP SERPL CALC-SCNC: 9 MMOL/L (ref 8–16)
AST SERPL-CCNC: 17 U/L (ref 10–40)
BASOPHILS # BLD AUTO: 0.07 K/UL (ref 0–0.2)
BASOPHILS NFR BLD: 0.4 % (ref 0–1.9)
BILIRUB SERPL-MCNC: 0.8 MG/DL (ref 0.1–1)
BUN SERPL-MCNC: 10 MG/DL (ref 6–20)
CALCIUM SERPL-MCNC: 8.5 MG/DL (ref 8.7–10.5)
CHLORIDE SERPL-SCNC: 104 MMOL/L (ref 95–110)
CO2 SERPL-SCNC: 24 MMOL/L (ref 23–29)
CREAT SERPL-MCNC: 0.8 MG/DL (ref 0.5–1.4)
DIFFERENTIAL METHOD: ABNORMAL
EOSINOPHIL # BLD AUTO: 0 K/UL (ref 0–0.5)
EOSINOPHIL NFR BLD: 0.3 % (ref 0–8)
ERYTHROCYTE [DISTWIDTH] IN BLOOD BY AUTOMATED COUNT: 12.2 % (ref 11.5–14.5)
EST. GFR  (AFRICAN AMERICAN): >60 ML/MIN/1.73 M^2
EST. GFR  (NON AFRICAN AMERICAN): >60 ML/MIN/1.73 M^2
GLUCOSE SERPL-MCNC: 94 MG/DL (ref 70–110)
HCT VFR BLD AUTO: 43.7 % (ref 40–54)
HGB BLD-MCNC: 15 G/DL (ref 14–18)
IMM GRANULOCYTES # BLD AUTO: 0.05 K/UL (ref 0–0.04)
IMM GRANULOCYTES NFR BLD AUTO: 0.3 % (ref 0–0.5)
LYMPHOCYTES # BLD AUTO: 1.8 K/UL (ref 1–4.8)
LYMPHOCYTES NFR BLD: 11.6 % (ref 18–48)
MAGNESIUM SERPL-MCNC: 2.1 MG/DL (ref 1.6–2.6)
MCH RBC QN AUTO: 30.7 PG (ref 27–31)
MCHC RBC AUTO-ENTMCNC: 34.3 G/DL (ref 32–36)
MCV RBC AUTO: 89 FL (ref 82–98)
MONOCYTES # BLD AUTO: 1.5 K/UL (ref 0.3–1)
MONOCYTES NFR BLD: 9.7 % (ref 4–15)
NEUTROPHILS # BLD AUTO: 12.2 K/UL (ref 1.8–7.7)
NEUTROPHILS NFR BLD: 77.7 % (ref 38–73)
NRBC BLD-RTO: 0 /100 WBC
PLATELET # BLD AUTO: 140 K/UL (ref 150–350)
PMV BLD AUTO: 12.3 FL (ref 9.2–12.9)
POCT GLUCOSE: 76 MG/DL (ref 70–110)
POCT GLUCOSE: 92 MG/DL (ref 70–110)
POTASSIUM SERPL-SCNC: 3.8 MMOL/L (ref 3.5–5.1)
PROT SERPL-MCNC: 6.3 G/DL (ref 6–8.4)
RBC # BLD AUTO: 4.89 M/UL (ref 4.6–6.2)
SODIUM SERPL-SCNC: 137 MMOL/L (ref 136–145)
WBC # BLD AUTO: 15.71 K/UL (ref 3.9–12.7)

## 2019-10-23 PROCEDURE — 82962 GLUCOSE BLOOD TEST: CPT

## 2019-10-23 PROCEDURE — 99217 PR OBSERVATION CARE DISCHARGE: ICD-10-PCS | Mod: ,,, | Performed by: PHYSICIAN ASSISTANT

## 2019-10-23 PROCEDURE — 83735 ASSAY OF MAGNESIUM: CPT

## 2019-10-23 PROCEDURE — 63600175 PHARM REV CODE 636 W HCPCS: Performed by: NURSE PRACTITIONER

## 2019-10-23 PROCEDURE — G0378 HOSPITAL OBSERVATION PER HR: HCPCS

## 2019-10-23 PROCEDURE — 80053 COMPREHEN METABOLIC PANEL: CPT

## 2019-10-23 PROCEDURE — 36415 COLL VENOUS BLD VENIPUNCTURE: CPT

## 2019-10-23 PROCEDURE — 25000003 PHARM REV CODE 250: Performed by: PHYSICIAN ASSISTANT

## 2019-10-23 PROCEDURE — 85025 COMPLETE CBC W/AUTO DIFF WBC: CPT

## 2019-10-23 PROCEDURE — 63600175 PHARM REV CODE 636 W HCPCS: Performed by: PHYSICIAN ASSISTANT

## 2019-10-23 PROCEDURE — 99217 PR OBSERVATION CARE DISCHARGE: CPT | Mod: ,,, | Performed by: PHYSICIAN ASSISTANT

## 2019-10-23 PROCEDURE — 25000003 PHARM REV CODE 250: Performed by: NURSE PRACTITIONER

## 2019-10-23 RX ORDER — METFORMIN HYDROCHLORIDE 1000 MG/1
TABLET ORAL
Start: 2019-10-23

## 2019-10-23 RX ORDER — ONDANSETRON HYDROCHLORIDE 8 MG/1
8 TABLET, FILM COATED ORAL EVERY 8 HOURS PRN
Qty: 10 TABLET | Refills: 0 | Status: SHIPPED | OUTPATIENT
Start: 2019-10-23 | End: 2019-10-27

## 2019-10-23 RX ORDER — PROMETHAZINE HYDROCHLORIDE 12.5 MG/1
12.5 SUPPOSITORY RECTAL EVERY 6 HOURS PRN
Qty: 12 SUPPOSITORY | Refills: 0 | OUTPATIENT
Start: 2019-10-23 | End: 2022-05-06

## 2019-10-23 RX ORDER — ALUMINUM HYDROXIDE, MAGNESIUM HYDROXIDE, AND SIMETHICONE 2400; 240; 2400 MG/30ML; MG/30ML; MG/30ML
30 SUSPENSION ORAL EVERY 8 HOURS
Status: DISCONTINUED | OUTPATIENT
Start: 2019-10-23 | End: 2019-10-23 | Stop reason: HOSPADM

## 2019-10-23 RX ADMIN — ALUMINUM HYDROXIDE, MAGNESIUM HYDROXIDE, AND DIMETHICONE 30 ML: 400; 400; 40 SUSPENSION ORAL at 11:10

## 2019-10-23 RX ADMIN — METOCLOPRAMIDE 10 MG: 5 INJECTION, SOLUTION INTRAMUSCULAR; INTRAVENOUS at 05:10

## 2019-10-23 RX ADMIN — SODIUM CHLORIDE: 0.9 INJECTION, SOLUTION INTRAVENOUS at 01:10

## 2019-10-23 RX ADMIN — AMLODIPINE BESYLATE 10 MG: 10 TABLET ORAL at 08:10

## 2019-10-23 RX ADMIN — NEBIVOLOL HYDROCHLORIDE 10 MG: 10 TABLET ORAL at 08:10

## 2019-10-23 NOTE — ASSESSMENT & PLAN NOTE
-chronic  -HgA1C 5.6  -repeat BMP and beta hydrox pending in AM->improved  -hold home metformin, continue holding until GI symptoms completely resolved  -low dose SSI inpatient as needed  -monitor accuchecks AC/HS and PRN hypoglycemic protocol   -further monitoring and management per PCP

## 2019-10-23 NOTE — DISCHARGE SUMMARY
[facility] Hospital Medicine  Discharge Summary      Patient Name: Mateo George  MRN: [id number]  Admission Date: 10/21/2019  Hospital Length of Stay: 0 days  Discharge Date and Time:  10/23/2019 1:25 PM  Attending Physician: SILVIA Pérez MD   Discharging Provider: Jacqui Vidal PA-C  Primary Care Provider: Primary Doctor [facility] Medicine Team: [facility] MED  Jacqui Vidal PA-C    HPI:   Mr. George is a 43 YOM with PMHx of HTN, HLD, and type II DM (on oral agents). He presented to the ED with complaints of N/V and light headiness. He also reports recent sore throat, decreased appetite, chest tightness, and cough that started a week ago and he was seen in Urgent Care where he received mucinex. He does note the mucinex improved chest tightness but has since developed N/V. He reports decreased appetite and PO intake. He denied bloody emesis, hematuria, blood in stools, fever, chills, abdominal pain, chest pain, shortness of breath, or recent sick contacts. He denies GI surgical procedures in the past. He lives with spouse and is independent in ADLs. He denies tobacco, alcohol, or illicit drug use.     In the ED: CBC with WBC of 15 otherwise unremarkable, chemistry with CO2 21 and AG 18 with normal glucose, LFTs unremarkable, lipase WNL, troponin negative, lactic WNL, beta-hydrox 2.7, flu negative, UA negative, CT A/P without acute pathology, and EKG NSR without dynamic changes. In the ED he was hydrated with IVFs and given anti-emetics however nausea has not resolved. The patient was placed in observation to the Hospital Medicine Service for further evaluation and treatment.     * No surgery found *      Hospital Course:   Pt placed in observation for intractable N/V.  WBC 15.44->19.32->15.71, lactic acid 1.4.  Alk Phos 142->119->113, ALT 61->48->38.  CT abdomen with contrast findings suggesting hepatic steatosis, correlation with LFTs recommended.  Bilateral perinephric fat stranding,  nonspecific. Correlation with urinalysis recommended.  Drug screen positive for marijuana, pt reports daily use however no use in ~1 week.  Hepatitis panel negative.  US ordered for further evaluation of epigastric/RUQ pain and unremarkable.  Day of discharge pt tolerating clear liquids, no vomiting.  Discharged with prn antiemetics.     Consults:   Consults (From admission, onward)        Status Ordering Provider     IP consult to case management  Once     Provider:  (Not yet assigned)    Acknowledged MIGUEL SALGUERO          * Nausea & vomiting-resolved as of 10/23/2019  -likely viral gastroenteritis   -in the ED CBC with WBC of 15 otherwise unremarkable, chemistry with CO2 21 and AG 18 with normal glucose, LFTs unremarkable, lipase WNL, troponin negative, lactic WNL, beta-hydrox 2.7, flu negative, UA negative, CT A/P without acute pathology, and EKG NSR without dynamic changes  -continue IVF hydration (initiated in ED)  -advance diet as tolerated  -PRN supportive care with anti-emetics  -WBC increased and low grade fever.  US abdomen.  -monitor AM labs: CBC, BMP, mag, lipid panel, HgA1C, and beta hydrox  -Hepatitis panel negative.  US ordered for further evaluation of epigastric/RUQ pain and unremarkable.  Day of discharge pt tolerating clear liquids, no vomiting.  Discharged with prn antiemetics.  -will need PCP follow up in 3-7 days.    Gastroenteritis  -see above     Type 2 diabetes mellitus  -chronic  -HgA1C 5.6  -repeat BMP and beta hydrox pending in AM->improved  -hold home metformin, continue holding until GI symptoms completely resolved  -low dose SSI inpatient as needed  -monitor accuchecks AC/HS and PRN hypoglycemic protocol   -further monitoring and management per PCP     Essential hypertension  -chronic, stable  -continue home amlodipine and bystolic  -dose/medication adjustment as appropriate   -further monitoring and management per PCP     Mixed hyperlipidemia  -chronic  -lipid panel low  HDL  -continue statin (LFTs WNL, no evidence of pancreatitis)      Final Active Diagnoses:    Diagnosis Date Noted POA    Gastroenteritis [K52.9] 10/21/2019 Unknown    Type 2 diabetes mellitus [E11.9] 10/21/2019 Unknown    Essential hypertension [I10] 10/21/2019 Unknown    Mixed hyperlipidemia [E78.2] 10/21/2019 Unknown      Problems Resolved During this Admission:    Diagnosis Date Noted Date Resolved POA    PRINCIPAL PROBLEM:  Nausea & vomiting [R11.2] 10/21/2019 10/23/2019 Yes       Discharged Condition: good    Disposition: Home or Self Care    Follow Up:  Follow-up Information     Maira Santiago MD.    Specialty:  Family Medicine  Why:  Primary Care   Contact information:  [facility] [facility] [id number] [phone]                 Patient Instructions:      Notify your health care provider if you experience any of the following:  temperature >100.4     Notify your health care provider if you experience any of the following:  persistent nausea and vomiting or diarrhea     Notify your health care provider if you experience any of the following:  severe persistent headache     Notify your health care provider if you experience any of the following:  persistent dizziness, light-headedness, or visual disturbances     Notify your health care provider if you experience any of the following:  increased confusion or weakness       Significant Diagnostic Studies: Labs: All labs within the past 24 hours have been reviewed    Pending Diagnostic Studies:     None         Medications:  Reconciled Home Medications:      Medication List      START taking these medications    GI COCKTAIL SIMPLE RECORD  Take 50 mLs by mouth 3 (three) times daily as needed.     ondansetron 8 MG tablet  Commonly known as:  ZOFRAN  Take 1 tablet (8 mg total) by mouth every 8 (eight) hours as needed for Nausea.     promethazine 12.5 MG Supp  Commonly known as:  PHENERGAN  Place 1 suppository (12.5 mg total) rectally every 6 (six)  hours as needed.        CHANGE how you take these medications    metFORMIN 1000 MG tablet  Commonly known as:  GLUCOPHAGE  Hold until nausea and diarrhea resolved  What changed:    · how much to take  · how to take this  · when to take this  · additional instructions        CONTINUE taking these medications    (MAGIC MOUTHWASH) 1:1:1 BENADRYL 12.5MG/5ML LIQ, ALUMINUM & MAGNESIUM  Swish and spit 10 mLs every 4 (four) hours as needed. for mouth sores     amLODIPine 10 MG tablet  Commonly known as:  NORVASC  Take 10 mg by mouth once daily.     atorvastatin 40 MG tablet  Commonly known as:  LIPITOR  TAKE BY MOUTH 1 TABLET AT BEDTIME     methocarbamol 750 MG Tab  Commonly known as:  ROBAXIN  TAKE BY MOUTH 1 TABLET 3 TIMES A DAY AS NEEDED     nebivolol 5 MG Tab  Commonly known as:  BYSTOLIC  Take 10 mg by mouth once daily.     ondansetron 4 MG Tbdl  Commonly known as:  ZOFRAN-ODT  Take 1 tablet (4 mg total) by mouth every 8 (eight) hours as needed.        STOP taking these medications    guaiFENesin 600 mg 12 hr tablet  Commonly known as:  MUCINEX     meloxicam 15 MG tablet  Commonly known as:  MOBIC            Indwelling Lines/Drains at time of discharge:   Lines/Drains/Airways     None                 Time spent on the discharge of patient: >30 minutes  Patient was seen and examined on the date of discharge and determined to be suitable for discharge.         Jacqui Vidal PA-C  Department of Hospital Medicine  [facility]

## 2019-10-23 NOTE — PLAN OF CARE
CM met with patient and patient's wife Ale to discus today's discharge to home. Patient in agreement with discharge plan. CM informed patient of follow up appt with Dr Santiago, pt verbalized understanding. Patient's wife will provide transportation home.       10/23/19 1504   Final Note   Assessment Type Final Discharge Note   Anticipated Discharge Disposition Home   What phone number can be called within the next 1-3 days to see how you are doing after discharge? [phone]   Hospital Follow Up  Appt(s) scheduled? Yes   Discharge plans and expectations educations in teach back method with documentation complete? Yes

## 2019-10-23 NOTE — ASSESSMENT & PLAN NOTE
-likely viral gastroenteritis   -in the ED CBC with WBC of 15 otherwise unremarkable, chemistry with CO2 21 and AG 18 with normal glucose, LFTs unremarkable, lipase WNL, troponin negative, lactic WNL, beta-hydrox 2.7, flu negative, UA negative, CT A/P without acute pathology, and EKG NSR without dynamic changes  -continue IVF hydration (initiated in ED)  -advance diet as tolerated  -PRN supportive care with anti-emetics  -WBC increased and low grade fever.  US abdomen.  -monitor AM labs: CBC, BMP, mag, lipid panel, HgA1C, and beta hydrox  -Hepatitis panel negative.  US ordered for further evaluation of epigastric/RUQ pain and unremarkable.  Day of discharge pt tolerating clear liquids, no vomiting.  Discharged with prn antiemetics.  -will need PCP follow up in 3-7 days.

## 2019-10-23 NOTE — PLAN OF CARE
Patient alert and oriented. Patient sitting up in bed. Patient denies any discomfort or distress at this time. Will continue to monitor.

## 2020-01-23 ENCOUNTER — HOSPITAL ENCOUNTER (OUTPATIENT)
Dept: RADIOLOGY | Facility: HOSPITAL | Age: 45
Discharge: HOME | End: 2020-01-23
Attending: ORTHOPAEDIC SURGERY
Payer: COMMERCIAL

## 2020-01-23 ENCOUNTER — OFFICE VISIT (OUTPATIENT)
Dept: SPORTS MEDICINE | Facility: CLINIC | Age: 45
End: 2020-01-23
Payer: COMMERCIAL

## 2020-01-23 VITALS
DIASTOLIC BLOOD PRESSURE: 86 MMHG | HEIGHT: 66 IN | WEIGHT: 218 LBS | HEART RATE: 75 BPM | BODY MASS INDEX: 35.03 KG/M2 | SYSTOLIC BLOOD PRESSURE: 130 MMHG

## 2020-01-23 DIAGNOSIS — M25.511 RIGHT SHOULDER PAIN, UNSPECIFIED CHRONICITY: ICD-10-CM

## 2020-01-23 DIAGNOSIS — M25.511 RIGHT SHOULDER PAIN, UNSPECIFIED CHRONICITY: Primary | ICD-10-CM

## 2020-01-23 PROCEDURE — 3008F PR BODY MASS INDEX (BMI) DOCUMENTED: ICD-10-PCS | Mod: CPTII,S$GLB,, | Performed by: ORTHOPAEDIC SURGERY

## 2020-01-23 PROCEDURE — 3008F BODY MASS INDEX DOCD: CPT | Mod: CPTII,S$GLB,, | Performed by: ORTHOPAEDIC SURGERY

## 2020-01-23 PROCEDURE — 99999 PR PBB SHADOW E&M-EST. PATIENT-LVL III: CPT | Mod: PBBFAC,,, | Performed by: ORTHOPAEDIC SURGERY

## 2020-01-23 PROCEDURE — 73030 X-RAY EXAM OF SHOULDER: CPT | Mod: TC,RT

## 2020-01-23 PROCEDURE — 99203 PR OFFICE/OUTPT VISIT, NEW, LEVL III, 30-44 MIN: ICD-10-PCS | Mod: S$GLB,,, | Performed by: ORTHOPAEDIC SURGERY

## 2020-01-23 PROCEDURE — 99999 PR PBB SHADOW E&M-EST. PATIENT-LVL III: ICD-10-PCS | Mod: PBBFAC,,, | Performed by: ORTHOPAEDIC SURGERY

## 2020-01-23 PROCEDURE — 73030 X-RAY EXAM OF SHOULDER: CPT | Mod: 26,RT,, | Performed by: RADIOLOGY

## 2020-01-23 PROCEDURE — 99203 OFFICE O/P NEW LOW 30 MIN: CPT | Mod: S$GLB,,, | Performed by: ORTHOPAEDIC SURGERY

## 2020-01-23 PROCEDURE — 73030 XR SHOULDER COMPLETE 2 OR MORE VIEWS RIGHT: ICD-10-PCS | Mod: 26,RT,, | Performed by: RADIOLOGY

## 2020-01-23 NOTE — PROGRESS NOTES
CC: RIGHT shoulder pain     44 y.o. Male presents as a new patient to me. He works as at  [employer]. Right shoulder pain x many years. Atraumatic onset. Pain localizes mostly anteriorly.  Also deep in the shoulder with subjective sense of a painful rubbing in the shoulder. Worse with lifting. He reports that the pain and weakness are worse with overhead activity. It also bothers him at night.  Better with rest.  Denies prior instability symptoms. Denies neck pain or radicular symptoms. Treatment thus far has included activity modifications, rest, and oral medication. He has had one previous CSI that helped transiently 2 years go.  Treatment has also included formal physical therapy.  No neck or radicular symptoms. Here today to discuss diagnosis and treatment options.     PMHx notable for none.   Negative for tobacco.   Negative for diabetes.     Pain Score: 0-No pain    PAST MEDICAL HISTORY:   Past Medical History:   Diagnosis Date    Hyperlipidemia     Hypertension     Prediabetes      PAST SURGICAL HISTORY:  History reviewed. No pertinent surgical history.    FAMILY HISTORY:  History reviewed. No pertinent family history.    MEDICATIONS:    Current Outpatient Medications:     (Magic mouthwash) 1:1:1 Benadryl 12.5mg/5ml liq, aluminum & magnesium hydroxide-simehticone (Maalox), lidocaine viscous 2%, Swish and spit 10 mLs every 4 (four) hours as needed. for mouth sores, Disp: 100 mL, Rfl: 0    amlodipine (NORVASC) 10 MG tablet, Take 10 mg by mouth once daily., Disp: , Rfl:     atorvastatin (LIPITOR) 40 MG tablet, TAKE BY MOUTH 1 TABLET AT BEDTIME, Disp: , Rfl: 0    GI cocktail antac/dicyc/lidoc, Take 50 mLs by mouth 3 (three) times daily as needed., Disp: 300 mL, Rfl: 0    metFORMIN (GLUCOPHAGE) 1000 MG tablet, Hold until nausea and diarrhea resolved, Disp: , Rfl:     methocarbamol (ROBAXIN) 750 MG Tab, TAKE BY MOUTH 1 TABLET 3 TIMES A DAY AS NEEDED, Disp: , Rfl: 0    nebivolol (BYSTOLIC) 5 MG Tab, Take  "10 mg by mouth once daily., Disp: , Rfl:     ondansetron (ZOFRAN-ODT) 4 MG TbDL, Take 1 tablet (4 mg total) by mouth every 8 (eight) hours as needed., Disp: 12 tablet, Rfl: 0    promethazine (PHENERGAN) 12.5 MG Supp, Place 1 suppository (12.5 mg total) rectally every 6 (six) hours as needed., Disp: 12 suppository, Rfl: 0    ALLERGIES:  Review of patient's allergies indicates:  No Known Allergies     REVIEW OF SYSTEMS:  Constitution: Negative. Negative for chills, fever and night sweats.    Hematologic/Lymphatic: Negative for bleeding problem. Does not bruise/bleed easily.   Skin: Negative for dry skin, itching and rash.   Musculoskeletal: Negative for falls. Positive for right shoulder pain and  muscle weakness.     All other review of symptoms were reviewed and found to be noncontributory.     PHYSICAL EXAMINATION:  Vitals:  /86   Pulse 75   Ht 5' 6" (1.676 m)   Wt 98.9 kg (218 lb)   BMI 35.19 kg/m²    General: Well-developed well-nourished 44 y.o. malein no acute distress   Cardiovascular: Regular rhythm by palpation of distal pulse, normal color and temperature, no concerning varicosities on symptomatic side   Lungs: No labored breathing or wheezing appreciated   Neuro: Alert and oriented ×3   Psychiatric: well oriented to person, place and time, demonstrates normal mood and affect   Skin: No rashes, lesions or ulcers, normal temperature, turgor, and texture on uninvolved extremity    Ortho/SPM Exam  Examination of the right shoulder demonstrates no swelling or effusion. Mild TTP over bicipital groove. + TTP over AC joint and directly over the subscapularis insertion. Full symmetric ROM of the shoulder.  Pain with mid in terminal ranges of the shoulder. ER to 60 degrees bilaterally. 5/5 rotator cuff strength. Pain and weakness with resisted belly press and bear hug.  Intact lift-off. Positive Neer/Amezquita exam. + Biceps tension signs. +Pain with cross body adduction.  Positive compression rotation " test.  Stable load shift anterior and posterior.  Negative anterior apprehension. No midline neck tenderness.    IMAGING:  Xrays including AP, Outlet and Axillary Lateral of RIGHT shoulder are ordered / images reviewed by me:   Inferior glenohumeral marginal loose body.  No significant DJD.    ASSESSMENT:      ICD-10-CM ICD-9-CM   1. Right shoulder pain, unspecified chronicity M25.511 719.41     R shoulder possible SLAP tear/biceps root pathology with AC pain    PLAN:     -Findings and treatment options were discussed with the patient  - Plan for MRI R shoulder to assess SLAP and AC joint. Depending on the results we will consider CSI and PT versus shoulder arthroscopy. Will reconvene after MRI done.   -All questions answered    Procedures

## 2020-01-27 ENCOUNTER — HOSPITAL ENCOUNTER (OUTPATIENT)
Dept: RADIOLOGY | Facility: HOSPITAL | Age: 45
Discharge: HOME | End: 2020-01-27
Attending: ORTHOPAEDIC SURGERY
Payer: COMMERCIAL

## 2020-01-27 DIAGNOSIS — M25.511 RIGHT SHOULDER PAIN, UNSPECIFIED CHRONICITY: ICD-10-CM

## 2020-01-27 PROCEDURE — 73221 MRI SHOULDER WITHOUT CONTRAST RIGHT: ICD-10-PCS | Mod: 26,RT,, | Performed by: RADIOLOGY

## 2020-01-27 PROCEDURE — 73221 MRI JOINT UPR EXTREM W/O DYE: CPT | Mod: TC,RT

## 2020-01-27 PROCEDURE — 73221 MRI JOINT UPR EXTREM W/O DYE: CPT | Mod: 26,RT,, | Performed by: RADIOLOGY

## 2020-01-28 ENCOUNTER — OFFICE VISIT (OUTPATIENT)
Dept: SPORTS MEDICINE | Facility: CLINIC | Age: 45
End: 2020-01-28
Payer: COMMERCIAL

## 2020-01-28 VITALS
DIASTOLIC BLOOD PRESSURE: 79 MMHG | SYSTOLIC BLOOD PRESSURE: 129 MMHG | HEART RATE: 73 BPM | WEIGHT: 218 LBS | HEIGHT: 66 IN | BODY MASS INDEX: 35.03 KG/M2

## 2020-01-28 DIAGNOSIS — M24.011 LOOSE BODY IN RIGHT SHOULDER: ICD-10-CM

## 2020-01-28 DIAGNOSIS — M19.011 ARTHRITIS OF RIGHT ACROMIOCLAVICULAR JOINT: ICD-10-CM

## 2020-01-28 DIAGNOSIS — M25.511 RIGHT SHOULDER PAIN, UNSPECIFIED CHRONICITY: ICD-10-CM

## 2020-01-28 DIAGNOSIS — M75.21 BICEPS TENDINITIS OF RIGHT UPPER EXTREMITY: Primary | ICD-10-CM

## 2020-01-28 PROCEDURE — 3008F PR BODY MASS INDEX (BMI) DOCUMENTED: ICD-10-PCS | Mod: CPTII,S$GLB,, | Performed by: ORTHOPAEDIC SURGERY

## 2020-01-28 PROCEDURE — 3008F BODY MASS INDEX DOCD: CPT | Mod: CPTII,S$GLB,, | Performed by: ORTHOPAEDIC SURGERY

## 2020-01-28 PROCEDURE — 99999 PR PBB SHADOW E&M-EST. PATIENT-LVL III: ICD-10-PCS | Mod: PBBFAC,,, | Performed by: ORTHOPAEDIC SURGERY

## 2020-01-28 PROCEDURE — 99999 PR PBB SHADOW E&M-EST. PATIENT-LVL III: CPT | Mod: PBBFAC,,, | Performed by: ORTHOPAEDIC SURGERY

## 2020-01-28 PROCEDURE — 99214 OFFICE O/P EST MOD 30 MIN: CPT | Mod: S$GLB,,, | Performed by: ORTHOPAEDIC SURGERY

## 2020-01-28 PROCEDURE — 99214 PR OFFICE/OUTPT VISIT, EST, LEVL IV, 30-39 MIN: ICD-10-PCS | Mod: S$GLB,,, | Performed by: ORTHOPAEDIC SURGERY

## 2020-01-28 NOTE — PROGRESS NOTES
CC: RIGHT shoulder pain    Mateo George is a 44 y.o. male presents for MRI follow up.  No changes in history.    Previous History:   44 y.o. Male presents as a new patient to me. He  works as at  [employer]. Right shoulder pain x many years. Atraumatic onset. Pain localizes mostly anteriorly. Worse with lifting. He reports that the pain and weakness is worse with overhead activity. It also bothers him at night.  Better with rest. Denies neck pain or radicular symptoms. Treatment thus far has included activity modifications, rest, therapy, and oral medication. He has had one previous CSI that helped 2 years ago.  Continues to have problems despite these measures.  This is a daily, quality life issue for him.  Denies any subjective instability. No prior instability events. Here today to discuss diagnosis and treatment options.     PMHx notable for none.   Negative for tobacco.   Negative for diabetes.    Pain Score:   8    PAST MEDICAL HISTORY:   Past Medical History:   Diagnosis Date    Hyperlipidemia     Hypertension     Prediabetes      PAST SURGICAL HISTORY:  History reviewed. No pertinent surgical history.    FAMILY HISTORY:  History reviewed. No pertinent family history.    MEDICATIONS:    Current Outpatient Medications:     (Magic mouthwash) 1:1:1 Benadryl 12.5mg/5ml liq, aluminum & magnesium hydroxide-simehticone (Maalox), lidocaine viscous 2%, Swish and spit 10 mLs every 4 (four) hours as needed. for mouth sores, Disp: 100 mL, Rfl: 0    amlodipine (NORVASC) 10 MG tablet, Take 10 mg by mouth once daily., Disp: , Rfl:     atorvastatin (LIPITOR) 40 MG tablet, TAKE BY MOUTH 1 TABLET AT BEDTIME, Disp: , Rfl: 0    GI cocktail antac/dicyc/lidoc, Take 50 mLs by mouth 3 (three) times daily as needed., Disp: 300 mL, Rfl: 0    metFORMIN (GLUCOPHAGE) 1000 MG tablet, Hold until nausea and diarrhea resolved, Disp: , Rfl:     methocarbamol (ROBAXIN) 750 MG Tab, TAKE BY MOUTH 1 TABLET 3 TIMES A DAY AS NEEDED, Disp:  ", Rfl: 0    nebivolol (BYSTOLIC) 5 MG Tab, Take 10 mg by mouth once daily., Disp: , Rfl:     ondansetron (ZOFRAN-ODT) 4 MG TbDL, Take 1 tablet (4 mg total) by mouth every 8 (eight) hours as needed., Disp: 12 tablet, Rfl: 0    promethazine (PHENERGAN) 12.5 MG Supp, Place 1 suppository (12.5 mg total) rectally every 6 (six) hours as needed., Disp: 12 suppository, Rfl: 0    ALLERGIES:  Review of patient's allergies indicates:  No Known Allergies     REVIEW OF SYSTEMS:  Constitution: Negative. Negative for chills, fever and night sweats.    Hematologic/Lymphatic: Negative for bleeding problem. Does not bruise/bleed easily.   Skin: Negative for dry skin, itching and rash.   Musculoskeletal: Negative for falls. Positive for right shoulder pain and  muscle weakness.     All other review of symptoms were reviewed and found to be noncontributory.     PHYSICAL EXAMINATION:  Vitals:  /79   Pulse 73   Ht 5' 6" (1.676 m)   Wt 98.9 kg (218 lb)   BMI 35.19 kg/m²    General: Well-developed well-nourished 44 y.o. malein no acute distress   Cardiovascular: Regular rhythm by palpation of distal pulse, normal color and temperature, no concerning varicosities on symptomatic side   Lungs: No labored breathing or wheezing appreciated   Neuro: Alert and oriented ×3   Psychiatric: well oriented to person, place and time, demonstrates normal mood and affect   Skin: No rashes, lesions or ulcers, normal temperature, turgor, and texture on uninvolved extremity    Ortho/SPM Exam  Examination of the right shoulder demonstrates no swelling or effusion. Mild TTP over bicipital groove. + TTP over AC joint. Full symmetric ROM of the shoulder. Active ER at side to 60 degrees bilaterally. 5/5 rotator cuff strength. Belly press with some weakness. Some weakness with bear hug - limited due to pain. Positive Neer/Amezquita exam. + Speeds test. + Pain with cross body adduction.  Pain in the anterior apprehension position and with passive " rotational motion.  Denies instability. Stable load and shift posterior and anterior. Negative posterior push-pull.  Negative sulcus.    IMAGING:  Xrays including AP, Outlet and Axillary Lateral of RIGHT shoulder are ordered / images reviewed by me:   Osseous loose body.  Possible osteochondral fragment from the inferior humeral head. No evidence of glenoid bony deficiency.  No significant DJD.  Perhaps subtle distal clavicle osteolytic changes.    MRI R shoulder  Impression       1.  Small partial thickness rotator cuff tear, involving the insertional fibers of the subscapularis tendon.  There is focus of dystrophic calcification within the tendon, correlating with the 01/23/2020 plain film.    2.  Insertional tendinosis of the infraspinatus tendon.    3.  Mild AC joint arthropathy.       ASSESSMENT:      ICD-10-CM ICD-9-CM   1. Biceps tendinitis of right upper extremity M75.21 726.12   2. Arthritis of right acromioclavicular joint M19.011 716.91   3. Loose body in right shoulder M24.011 718.11   4. Right shoulder pain, unspecified chronicity M25.511 719.41     PLAN:     The patient has continued to have pain and problems in the shoulder despite prior conservative treatment.  Findings on exam correlate with imaging.  He wishes to proceed with surgery.    -Plan for a R shoulder arthroscopy, loose body removal, open subpectoral biceps tenodesis, DCE, possible subacromial decompression. The details of such were discussed to include the expected postop rehab and recovery course. No reported history of instability.  Plan to perform an examination under anesthesia carefully prior to proceeding.  -Planned DOS for  pending.   -Clearance: PCP  -RTC for preoperative appointment   -All questions answered    Informed Consent:    The details of the surgical procedure were explained, including the location of probable incisions and a description of possible hardware and/or grafts to be used. Alternatives to both operative and  non-operative options with associated risks and benefits were discussed. The patient understands the likely convalescence after surgery and, in particular, the expected postop rehab and recovery course. The outlined risks and potential complications of the proposed procedure include but are not limited to: infection, poor wound healing, scarring, deformity, stiffness, swelling, continued or recurrent pain, instability, hardware or prosthetic failure if implanted, symptomatic hardware requiring removal, weakness, neurovascular injury, numbness, chronic regional pain disorder, tissue nonhealing/irreparability/retear, subsequent contralateral limb injury or pathology, chondral injury, arthritis, fracture, blood clot formation, inability to return to previous level of activity, anesthetic or regional block complication up to death, need for additional procedure as indicated intraoperatively, and potential need for further surgery.    The patient was also informed and understands that the risks of surgery are greater for patients with a current condition or history of heart disease, obesity, clotting disorders, recurrent infections, steroid use, current or past smoking, and factors such as sedentary lifestyle and noncompliance with medications, therapy or follow-up. The degree of the increased risk is hard to estimate with any degree of precision. If applicable, smoking cessation was discussed.     All questions were answered. The patient has verbalized understanding of these issues and wishes to proceed with the surgery as discussed.         Procedures

## 2020-08-16 ENCOUNTER — OFFICE VISIT (OUTPATIENT)
Dept: URGENT CARE | Facility: CLINIC | Age: 45
End: 2020-08-16
Payer: COMMERCIAL

## 2020-08-16 VITALS
WEIGHT: 218 LBS | HEART RATE: 74 BPM | TEMPERATURE: 98 F | SYSTOLIC BLOOD PRESSURE: 136 MMHG | OXYGEN SATURATION: 98 % | DIASTOLIC BLOOD PRESSURE: 86 MMHG | BODY MASS INDEX: 35.03 KG/M2 | HEIGHT: 66 IN | RESPIRATION RATE: 18 BRPM

## 2020-08-16 DIAGNOSIS — M25.511 RIGHT SHOULDER PAIN, UNSPECIFIED CHRONICITY: Primary | ICD-10-CM

## 2020-08-16 PROCEDURE — 73030 X-RAY EXAM OF SHOULDER: CPT | Mod: FY,RT,S$GLB, | Performed by: RADIOLOGY

## 2020-08-16 PROCEDURE — 99214 OFFICE O/P EST MOD 30 MIN: CPT | Mod: S$GLB,,, | Performed by: NURSE PRACTITIONER

## 2020-08-16 PROCEDURE — 73030 XR SHOULDER COMPLETE 2 OR MORE VIEWS RIGHT: ICD-10-PCS | Mod: FY,RT,S$GLB, | Performed by: RADIOLOGY

## 2020-08-16 PROCEDURE — 99214 PR OFFICE/OUTPT VISIT, EST, LEVL IV, 30-39 MIN: ICD-10-PCS | Mod: S$GLB,,, | Performed by: NURSE PRACTITIONER

## 2020-08-16 NOTE — PROGRESS NOTES
"Subjective:       Patient ID: Mateo George is a 44 y.o. male.    Vitals:  height is 5' 6" (1.676 m) and weight is 98.9 kg (218 lb). His temperature is 98.3 °F (36.8 °C). His blood pressure is 136/86 and his pulse is 74. His respiration is 18 and oxygen saturation is 98%.     Chief Complaint: Shoulder Injury    This is a 44 y.o. male who presents today with a chief complaint of right shoulder pain, Pt has a history of a bad right shoulder, was supposed to have surgery on it prior to Covid. Pt was at the park to and fell, hurting his right shoulder. Pt felt like his fingers on his right hand are swollen, denies numbness and tingling or denies weakness to right arm or shoulder, patient reports if felt like something popping while he was trying to get a ball during playing with his son, patient denies pain at rest but reports pain with movement, patient reports pain is worse with overhead activity, patient reports he was taking Mobic and some pain medication that he has been taking off by his primary, denies any neck pain, patient reports he tried ibuprofen over-the-counter with relief          Shoulder Injury   The incident occurred at the park. The right shoulder is affected. The incident occurred 1 to 3 hours ago. The injury mechanism was a fall. The quality of the pain is described as shooting (throbbing). The pain radiates to the right arm. The pain is at a severity of 7/10. He has tried acetaminophen for the symptoms. The treatment provided no relief.       Constitution: Negative for fatigue.   HENT: Negative for ear pain.    Neck: Negative for painful lymph nodes.   Eyes: Negative for eye trauma.   Respiratory: Negative for sleep apnea.    Gastrointestinal: Negative for abdominal trauma.   Endocrine: hair loss.   Genitourinary: Negative for urgency and hematuria.   Musculoskeletal: Positive for trauma and joint pain. Negative for muscle cramps and history of spine disorder.   Skin: Negative for rash. "   Allergic/Immunologic: Negative for environmental allergies.   Neurological: Negative for coordination disturbances, altered mental status and tingling.   Hematologic/Lymphatic: Negative for swollen lymph nodes.   Psychiatric/Behavioral: Negative for altered mental status.       Objective:      Physical Exam   Constitutional: He is oriented to person, place, and time. He appears well-developed. He is cooperative.  Non-toxic appearance. He does not appear ill. No distress.   HENT:   Head: Normocephalic and atraumatic. Head is without abrasion, without contusion and without laceration.   Ears:   Right Ear: Hearing, tympanic membrane, external ear and ear canal normal. No hemotympanum.   Left Ear: Hearing, tympanic membrane, external ear and ear canal normal. No hemotympanum.   Nose: Nose normal. No mucosal edema, rhinorrhea or nasal deformity. No epistaxis. Right sinus exhibits no maxillary sinus tenderness and no frontal sinus tenderness. Left sinus exhibits no maxillary sinus tenderness and no frontal sinus tenderness.   Mouth/Throat: Uvula is midline, oropharynx is clear and moist and mucous membranes are normal. No trismus in the jaw. Normal dentition. No uvula swelling. No posterior oropharyngeal erythema.   Eyes: Pupils are equal, round, and reactive to light. Conjunctivae, EOM and lids are normal. Right eye exhibits no discharge. Left eye exhibits no discharge. No scleral icterus.   Neck: Trachea normal, normal range of motion, full passive range of motion without pain and phonation normal. Neck supple. No spinous process tenderness and no muscular tenderness present. No neck rigidity. No tracheal deviation present.   Cardiovascular: Normal rate, regular rhythm, normal heart sounds and normal pulses.   Pulmonary/Chest: Effort normal and breath sounds normal. No respiratory distress.   Abdominal: Soft. Normal appearance and bowel sounds are normal. He exhibits no distension, no pulsatile midline mass and no  mass. There is no abdominal tenderness.   Musculoskeletal:         General: No deformity.      Right shoulder: He exhibits pain. He exhibits normal range of motion, no tenderness, no bony tenderness, no swelling, no effusion, no crepitus, no deformity, no laceration, no spasm, normal pulse and normal strength.      Comments: Cap refill 2 seconds, right radial pulse 2+, sensation and strength intact intact, ROM intact  No swelling, erythema  Or tenderness to right hand/ fingers noted     Neurological: He is alert and oriented to person, place, and time. He has normal strength. No cranial nerve deficit or sensory deficit. He exhibits normal muscle tone. He displays no seizure activity. Coordination normal. GCS eye subscore is 4. GCS verbal subscore is 5. GCS motor subscore is 6.   Skin: Skin is warm, dry, intact, not diaphoretic and not pale. Capillary refill takes less than 2 seconds. abrasion, burn, bruising and ecchymosisPsychiatric: His speech is normal and behavior is normal. Judgment and thought content normal.   Nursing note and vitals reviewed.      X-ray Shoulder 2 Or More Views Right    Result Date: 8/16/2020  EXAMINATION: XR SHOULDER COMPLETE 2 OR MORE VIEWS RIGHT CLINICAL HISTORY: Pain in right shoulder TECHNIQUE: Three-view examination COMPARISON: 01/23/2020. FINDINGS: The alignment is within normal limits.  No displaced fractures identified.  No evidence of lytic or blastic lesions.Joint spaces are unremarkable.Soft tissues are unremarkable.Previously identified 1.24 cm ossicle is partially obscured yet remains present potentially loose body.     No interval change. Electronically signed by: Jacques Valdivia MD Date:    08/16/2020 Time:    15:34            44-year-old male presents with right shoulder pain post fall.  Patient does have history of arthritis of right acromioclavicular joint, biceps tendinitis of right  extremity, and loose body in right shoulder.  Patient followed up with [facility] until January and was supposed to have surgery but due to COVID-19 pandemic it was delayed.  X-ray today negative for any acute fracture.  Sling provided.  Patient opted to use over-the-counter ibuprofen for pain.  I strongly advised patient to follow-up with orthopedics Sports medicine for further evaluation.    Strict precautions given to patient to monitor for worsening signs and symptoms. Advised to follow up with primary.All questions answered. Strict ER precautions given. If your symptoms worsens of fail to improve you should go to the Emergency Room. Patient voiced understanding and in agreement with current treatment plan.        Assessment:       1. Right shoulder pain, unspecified chronicity        Plan:         Right shoulder pain, unspecified chronicity  -     X-ray Shoulder 2 or More Views Right; Future; Expected date: 08/16/2020  -     SLING FOR HOME USE      Patient Instructions   If your condition worsens or fails to improve we recommend that you receive another evaluation at the ER immediately or contact your PCP to discuss your concerns or return here. You must understand that you've received an urgent care treatment only and that you may be released before all your medical problems are known or treated. You the patient will arrange for followup care as instructed.    If you were prescribed antibiotics, please take them to completion.  If you were prescribed a narcotic medication, do not drive or operate heavy equipment or machinery while taking these medications.  Please follow up with your primary care doctor or specialist as needed.  If you  smoke, please stop smoking.    The Shoulder Joint    The shoulder is made up of bones, muscles, ligaments, and tendons. They work together so you can reach, swing, and lift in comfort. Learning about the parts of the shoulder joint will help you to understand your shoulder problem.  The parts of the joint  The shoulder joint is where the humerus  (upper arm bone) meets the scapula (shoulder blade):  · Muscles and ligaments help make up the joint. They attach to the shoulder blade and upper arm bone.  · At the top of the shoulder blade are two bony knobs called the acromion and coracoid process.  · The subacromial space is between the top of the humerus and the acromion. This space is filled with tendons, muscles, and the subacromial bursa.  · The bursa is a sac of fluid that cushions shoulder parts as they move.  The supraspinatus muscle and tendon are located in the subacromial space. They help form the rotator cuff and are commonly injured in a rotator cuff tear.  Date Last Reviewed: 10/12/2015  © 2898-1957 The "[employer]. [address] [place] [address], [place] [address]. All rights reserved. This information is not intended as a substitute for professional medical care. Always follow your healthcare professional's instructions.

## 2020-08-16 NOTE — LETTER
August 16, 2020      [facility]  [address]  [place] [address]  Phone: [phone]  Fax: [phone]       Patient: Mateo George   YOB: 1975  Date of Visit: 08/16/2020    To Whom It May Concern:    Jed George  was at [facility] on 08/16/2020. He may return to work/school on 08/18/2020 with no restrictions. If you have any questions or concerns, or if I can be of further assistance, please do not hesitate to contact me.    Sincerely,    Gary Anderson, RT

## 2020-08-16 NOTE — PATIENT INSTRUCTIONS
If your condition worsens or fails to improve we recommend that you receive another evaluation at the ER immediately or contact your PCP to discuss your concerns or return here. You must understand that you've received an urgent care treatment only and that you may be released before all your medical problems are known or treated. You the patient will arrange for followup care as instructed.    If you were prescribed antibiotics, please take them to completion.  If you were prescribed a narcotic medication, do not drive or operate heavy equipment or machinery while taking these medications.  Please follow up with your primary care doctor or specialist as needed.  If you  smoke, please stop smoking.    The Shoulder Joint    The shoulder is made up of bones, muscles, ligaments, and tendons. They work together so you can reach, swing, and lift in comfort. Learning about the parts of the shoulder joint will help you to understand your shoulder problem.  The parts of the joint  The shoulder joint is where the humerus (upper arm bone) meets the scapula (shoulder blade):  · Muscles and ligaments help make up the joint. They attach to the shoulder blade and upper arm bone.  · At the top of the shoulder blade are two bony knobs called the acromion and coracoid process.  · The subacromial space is between the top of the humerus and the acromion. This space is filled with tendons, muscles, and the subacromial bursa.  · The bursa is a sac of fluid that cushions shoulder parts as they move.  The supraspinatus muscle and tendon are located in the subacromial space. They help form the rotator cuff and are commonly injured in a rotator cuff tear.  Date Last Reviewed: 10/12/2015  © 3722-7899 [employer]. [address] [place] [address], [place] [address]. All rights reserved. This information is not intended as a substitute for professional medical care. Always follow your healthcare professional's instructions.

## 2020-08-17 NOTE — PROGRESS NOTES
CC: Right shoulder pain     44 y.o. Male returns to clinic for continued right shoulder pain.  Prior findings most consistent with symptomatic biceps tendinitis, AC arthritis and a loose body in the glenohumeral joint.  The patient had failed prior conservative treatment and we previously have discussed surgical options to include arthroscopy with loose body removal, open subpectoral biceps tenodesis, DCE and possible subacromial decompression.  This surgery was delayed due to the virus pandemic.  The patient has had no additional treatment since our last appointment.  He continues to have pain and injured the shoulder a couple days ago while playing with his son.  He localizes today over the AC joint and deep in the shoulder joint as well     PAST MEDICAL HISTORY:   Past Medical History:   Diagnosis Date    Hyperlipidemia     Hypertension     Prediabetes      PAST SURGICAL HISTORY:  No past surgical history on file.    FAMILY HISTORY:  No family history on file.    MEDICATIONS:    Current Outpatient Medications:     (Magic mouthwash) 1:1:1 Benadryl 12.5mg/5ml liq, aluminum & magnesium hydroxide-simehticone (Maalox), lidocaine viscous 2%, Swish and spit 10 mLs every 4 (four) hours as needed. for mouth sores, Disp: 100 mL, Rfl: 0    amlodipine (NORVASC) 10 MG tablet, Take 10 mg by mouth once daily., Disp: , Rfl:     atorvastatin (LIPITOR) 40 MG tablet, TAKE BY MOUTH 1 TABLET AT BEDTIME, Disp: , Rfl: 0    GI cocktail antac/dicyc/lidoc, Take 50 mLs by mouth 3 (three) times daily as needed., Disp: 300 mL, Rfl: 0    metFORMIN (GLUCOPHAGE) 1000 MG tablet, Hold until nausea and diarrhea resolved, Disp: , Rfl:     methocarbamol (ROBAXIN) 750 MG Tab, TAKE BY MOUTH 1 TABLET 3 TIMES A DAY AS NEEDED, Disp: , Rfl: 0    nebivolol (BYSTOLIC) 5 MG Tab, Take 10 mg by mouth once daily., Disp: , Rfl:     ondansetron (ZOFRAN-ODT) 4 MG TbDL, Take 1 tablet (4 mg total) by mouth every 8 (eight) hours as needed., Disp: 12  tablet, Rfl: 0    promethazine (PHENERGAN) 12.5 MG Supp, Place 1 suppository (12.5 mg total) rectally every 6 (six) hours as needed., Disp: 12 suppository, Rfl: 0    ALLERGIES:  Review of patient's allergies indicates:  No Known Allergies     REVIEW OF SYSTEMS:  Constitution: Negative. Negative for chills, fever and night sweats.    Hematologic/Lymphatic: Negative for bleeding problem. Does not bruise/bleed easily.   Skin: Negative for dry skin, itching and rash.   Musculoskeletal: Negative for falls. Positive for right shoulder pain and muscle weakness.     All other review of symptoms were reviewed and found to be noncontributory.     PHYSICAL EXAMINATION:  Vitals:  There were no vitals taken for this visit.   General: Well-developed well-nourished 44 y.o. malein no acute distress   Cardiovascular: Regular rhythm by palpation of distal pulse, normal color and temperature, no concerning varicosities on symptomatic side   Lungs: No labored breathing or wheezing appreciated   Neuro: Alert and oriented ×3   Psychiatric: well oriented to person, place and time, demonstrates normal mood and affect   Skin: No rashes, lesions or ulcers, normal temperature, turgor, and texture on uninvolved extremity    Ortho/SPM Exam  Examination of the right shoulder again demonstrates no swelling or effusion. Mild TTP over bicipital groove. + Significanrt TTP over AC joint and directly over the subscapularis insertion. Full symmetric ROM of the shoulder.  Pain with mid in terminal ranges of the shoulder. ER to 60 degrees bilaterally. 5/5 rotator cuff strength. Pain and weakness with resisted belly press and bear hug.  Intact lift-off. Positive Neer/Amezquita exam. + Biceps tension signs. +Pain with cross body adduction.  Stable load shift anterior and posterior.  Negative anterior apprehension. No midline neck tenderness.    IMAGING:  Prior Xrays including AP, Outlet and Axillary Lateral of RIGHT shoulder are  reviewed by me:   Osseous  loose body.  Possible osteochondral fragment from the inferior  humeral head. No evidence of glenoid bony deficiency.  No significant DJD.  Perhaps subtle distal clavicle osteolytic changes.     MRI from 1/23/2020 of RIGHT shoulder reviewed by Dr. Barry and discussed with patient. Study shows: Small partial thickness rotator cuff tear, involving the insertional fibers of the subscapularis tendon.  There is focus of dystrophic calcification within the tendon, correlating with the 01/23/2020 plain film. Insertional tendinosis of the infraspinatus tendon. Moderate AC joint arthropathy.    ASSESSMENT:      ICD-10-CM ICD-9-CM   1. Biceps tendinitis of right upper extremity  M75.21 726.12   2. Arthritis of right acromioclavicular joint  M19.011 716.91     PLAN:     -Findings and treatment options were discussed with the patient  -Going to pursue nonoperative treatment at this time due to the upcoming birth of his child and and expected need to help his wife.  Intra-articular glenohumeral steroid injection given today for perhaps relief in the meantime.  He tolerated this well.  -Home-going therapy instructed, demonstrated to patient in office  -RTC 6 weeks.  If pain is persistent or recurrent, we will we consider proceeding with surgery as discussed previously.  -All questions answered.    HEP 13403 - ARMIDA. Aric Barry MD and SMA Celio, instructed and demonstrated a periscapular and rotator cuff strengthening HEP. Instruction and demonstration of an AAROM and stretching HEP was also performed. The patient then demonstrated understanding of exercises and proper technique. This program was performed for 15 minutes.     Large Joint Aspiration/Injection: R glenohumeral    Date/Time: 8/18/2020 2:45 PM  Performed by: ARMIDA Barry MD  Authorized by: ARMIDA Barry MD     Consent Done?:  Yes (Verbal)  Indications:  Pain  Site marked: the procedure site was marked    Timeout: prior to procedure the correct  patient, procedure, and site was verified    Prep: patient was prepped and draped in usual sterile fashion      Local anesthesia used?: Yes    Local anesthetic:  Co-phenylcaine spray (0.2% Naropin)  Anesthetic total (ml):  4      Details:  Needle Size:  22 G  Approach:  Superior  Location:  Shoulder  Site:  R glenohumeral  Medications:  40 mg triamcinolone acetonide 40 mg/mL  Patient tolerance:  Patient tolerated the procedure well with no immediate complications

## 2020-08-18 ENCOUNTER — OFFICE VISIT (OUTPATIENT)
Dept: SPORTS MEDICINE | Facility: CLINIC | Age: 45
End: 2020-08-18
Payer: COMMERCIAL

## 2020-08-18 VITALS
HEART RATE: 79 BPM | HEIGHT: 66 IN | DIASTOLIC BLOOD PRESSURE: 67 MMHG | BODY MASS INDEX: 37.61 KG/M2 | SYSTOLIC BLOOD PRESSURE: 117 MMHG | WEIGHT: 234 LBS

## 2020-08-18 DIAGNOSIS — M75.21 BICEPS TENDINITIS OF RIGHT UPPER EXTREMITY: Primary | ICD-10-CM

## 2020-08-18 DIAGNOSIS — M19.011 ARTHRITIS OF RIGHT ACROMIOCLAVICULAR JOINT: ICD-10-CM

## 2020-08-18 PROCEDURE — 3008F BODY MASS INDEX DOCD: CPT | Mod: CPTII,S$GLB,, | Performed by: ORTHOPAEDIC SURGERY

## 2020-08-18 PROCEDURE — 3008F PR BODY MASS INDEX (BMI) DOCUMENTED: ICD-10-PCS | Mod: CPTII,S$GLB,, | Performed by: ORTHOPAEDIC SURGERY

## 2020-08-18 PROCEDURE — 99213 PR OFFICE/OUTPT VISIT, EST, LEVL III, 20-29 MIN: ICD-10-PCS | Mod: 25,S$GLB,, | Performed by: ORTHOPAEDIC SURGERY

## 2020-08-18 PROCEDURE — 99999 PR PBB SHADOW E&M-EST. PATIENT-LVL III: CPT | Mod: PBBFAC,,, | Performed by: ORTHOPAEDIC SURGERY

## 2020-08-18 PROCEDURE — 99999 PR PBB SHADOW E&M-EST. PATIENT-LVL III: ICD-10-PCS | Mod: PBBFAC,,, | Performed by: ORTHOPAEDIC SURGERY

## 2020-08-18 PROCEDURE — 99213 OFFICE O/P EST LOW 20 MIN: CPT | Mod: 25,S$GLB,, | Performed by: ORTHOPAEDIC SURGERY

## 2020-08-18 PROCEDURE — 97110 THERAPEUTIC EXERCISES: CPT | Mod: GP,S$GLB,, | Performed by: ORTHOPAEDIC SURGERY

## 2020-08-18 PROCEDURE — 20610 DRAIN/INJ JOINT/BURSA W/O US: CPT | Mod: RT,S$GLB,, | Performed by: ORTHOPAEDIC SURGERY

## 2020-08-18 PROCEDURE — 97110 PR THERAPEUTIC EXERCISES: ICD-10-PCS | Mod: GP,S$GLB,, | Performed by: ORTHOPAEDIC SURGERY

## 2020-08-18 PROCEDURE — 20610 LARGE JOINT ASPIRATION/INJECTION: R GLENOHUMERAL: ICD-10-PCS | Mod: RT,S$GLB,, | Performed by: ORTHOPAEDIC SURGERY

## 2020-08-18 RX ADMIN — TRIAMCINOLONE ACETONIDE 40 MG: 40 INJECTION, SUSPENSION INTRA-ARTICULAR; INTRAMUSCULAR at 02:08

## 2020-08-18 NOTE — LETTER
Patient: Mateo George   YOB: 1975   Clinic Number: [id number]   Today's Date: August 18, 2020                  Work Status Summary     To Whom this may concern,    I recommend this patient remain off of work tomorrow and return on 8/20/2020. If you have any questions, please let me know.          ____________________________         August 18, 2020    Efrain Barry MD  Signed (Dr. SILVIA Barry)

## 2020-08-30 RX ORDER — TRIAMCINOLONE ACETONIDE 40 MG/ML
40 INJECTION, SUSPENSION INTRA-ARTICULAR; INTRAMUSCULAR
Status: DISCONTINUED | OUTPATIENT
Start: 2020-08-18 | End: 2020-08-30 | Stop reason: HOSPADM

## 2021-03-10 ENCOUNTER — IMMUNIZATION (OUTPATIENT)
Dept: PRIMARY CARE CLINIC | Facility: CLINIC | Age: 46
End: 2021-03-10

## 2021-03-10 DIAGNOSIS — Z23 NEED FOR VACCINATION: Primary | ICD-10-CM

## 2021-03-10 PROCEDURE — 91303 PR SARSCOV2 VAC AD26 .5ML IM: CPT | Mod: S$GLB,,, | Performed by: INTERNAL MEDICINE

## 2021-03-10 PROCEDURE — 0031A PR IMMUNIZ ADMIN, SARS-COV-2 COVID-19 VACC, 5X10VP/0.5ML: CPT | Mod: CV19,S$GLB,, | Performed by: INTERNAL MEDICINE

## 2021-03-10 PROCEDURE — 0031A PR IMMUNIZ ADMIN, SARS-COV-2 COVID-19 VACC, 5X10VP/0.5ML: ICD-10-PCS | Mod: CV19,S$GLB,, | Performed by: INTERNAL MEDICINE

## 2021-03-10 PROCEDURE — 91303 PR SARSCOV2 VAC AD26 .5ML IM: ICD-10-PCS | Mod: S$GLB,,, | Performed by: INTERNAL MEDICINE

## 2021-06-02 ENCOUNTER — OFFICE VISIT (OUTPATIENT)
Dept: URGENT CARE | Facility: CLINIC | Age: 46
End: 2021-06-02
Payer: COMMERCIAL

## 2021-06-02 ENCOUNTER — HOSPITAL ENCOUNTER (EMERGENCY)
Facility: HOSPITAL | Age: 46
Discharge: HOME | End: 2021-06-02
Attending: EMERGENCY MEDICINE
Payer: COMMERCIAL

## 2021-06-02 VITALS
HEIGHT: 66 IN | BODY MASS INDEX: 38.41 KG/M2 | RESPIRATION RATE: 18 BRPM | DIASTOLIC BLOOD PRESSURE: 84 MMHG | OXYGEN SATURATION: 100 % | SYSTOLIC BLOOD PRESSURE: 155 MMHG | WEIGHT: 239 LBS | TEMPERATURE: 99 F | HEART RATE: 80 BPM

## 2021-06-02 VITALS
HEART RATE: 74 BPM | WEIGHT: 239 LBS | BODY MASS INDEX: 38.41 KG/M2 | SYSTOLIC BLOOD PRESSURE: 135 MMHG | TEMPERATURE: 98 F | HEIGHT: 66 IN | DIASTOLIC BLOOD PRESSURE: 83 MMHG | OXYGEN SATURATION: 100 %

## 2021-06-02 DIAGNOSIS — K12.2 UVULITIS: Primary | ICD-10-CM

## 2021-06-02 DIAGNOSIS — T78.3XXA ANGIOEDEMA, INITIAL ENCOUNTER: Primary | ICD-10-CM

## 2021-06-02 LAB
GROUP A STREP, MOLECULAR: NEGATIVE
HCV AB SERPL QL IA: NEGATIVE
HIV 1+2 AB+HIV1 P24 AG SERPL QL IA: NEGATIVE

## 2021-06-02 PROCEDURE — 3008F PR BODY MASS INDEX (BMI) DOCUMENTED: ICD-10-PCS | Mod: CPTII,S$GLB,, | Performed by: PHYSICIAN ASSISTANT

## 2021-06-02 PROCEDURE — 99284 PR EMERGENCY DEPT VISIT,LEVEL IV: ICD-10-PCS | Mod: ,,, | Performed by: EMERGENCY MEDICINE

## 2021-06-02 PROCEDURE — 99214 PR OFFICE/OUTPT VISIT, EST, LEVL IV, 30-39 MIN: ICD-10-PCS | Mod: 25,S$GLB,, | Performed by: PHYSICIAN ASSISTANT

## 2021-06-02 PROCEDURE — 87651 STREP A DNA AMP PROBE: CPT | Performed by: NURSE PRACTITIONER

## 2021-06-02 PROCEDURE — 99284 EMERGENCY DEPT VISIT MOD MDM: CPT | Mod: ,,, | Performed by: EMERGENCY MEDICINE

## 2021-06-02 PROCEDURE — 3008F BODY MASS INDEX DOCD: CPT | Mod: CPTII,S$GLB,, | Performed by: PHYSICIAN ASSISTANT

## 2021-06-02 PROCEDURE — 96372 THER/PROPH/DIAG INJ SC/IM: CPT | Mod: S$GLB,,, | Performed by: PHYSICIAN ASSISTANT

## 2021-06-02 PROCEDURE — 86803 HEPATITIS C AB TEST: CPT | Performed by: EMERGENCY MEDICINE

## 2021-06-02 PROCEDURE — 99214 OFFICE O/P EST MOD 30 MIN: CPT | Mod: 25,S$GLB,, | Performed by: PHYSICIAN ASSISTANT

## 2021-06-02 PROCEDURE — 99283 EMERGENCY DEPT VISIT LOW MDM: CPT

## 2021-06-02 PROCEDURE — 86703 HIV-1/HIV-2 1 RESULT ANTBDY: CPT | Performed by: EMERGENCY MEDICINE

## 2021-06-02 PROCEDURE — 96372 PR INJECTION,THERAP/PROPH/DIAG2ST, IM OR SUBCUT: ICD-10-PCS | Mod: S$GLB,,, | Performed by: PHYSICIAN ASSISTANT

## 2021-06-02 RX ORDER — CLONAZEPAM 1 MG/1
1 TABLET ORAL 2 TIMES DAILY PRN
COMMUNITY
Start: 2021-01-20

## 2021-06-02 RX ORDER — BETAMETHASONE SODIUM PHOSPHATE AND BETAMETHASONE ACETATE 3; 3 MG/ML; MG/ML
9 INJECTION, SUSPENSION INTRA-ARTICULAR; INTRALESIONAL; INTRAMUSCULAR; SOFT TISSUE
Status: DISCONTINUED | OUTPATIENT
Start: 2021-06-02 | End: 2021-06-02

## 2021-07-04 ENCOUNTER — HOSPITAL ENCOUNTER (EMERGENCY)
Facility: HOSPITAL | Age: 46
Discharge: HOME | End: 2021-07-04
Attending: EMERGENCY MEDICINE
Payer: COMMERCIAL

## 2021-07-04 VITALS
DIASTOLIC BLOOD PRESSURE: 70 MMHG | TEMPERATURE: 99 F | HEART RATE: 69 BPM | BODY MASS INDEX: 38.74 KG/M2 | WEIGHT: 240 LBS | SYSTOLIC BLOOD PRESSURE: 111 MMHG | RESPIRATION RATE: 16 BRPM | OXYGEN SATURATION: 98 %

## 2021-07-04 DIAGNOSIS — S39.012A STRAIN OF LUMBAR REGION, INITIAL ENCOUNTER: Primary | ICD-10-CM

## 2021-07-04 DIAGNOSIS — M54.50 ACUTE LEFT-SIDED LOW BACK PAIN WITHOUT SCIATICA: ICD-10-CM

## 2021-07-04 PROCEDURE — 63600175 PHARM REV CODE 636 W HCPCS: Performed by: NURSE PRACTITIONER

## 2021-07-04 PROCEDURE — 99282 EMERGENCY DEPT VISIT SF MDM: CPT | Mod: ,,, | Performed by: NURSE PRACTITIONER

## 2021-07-04 PROCEDURE — 99282 PR EMERGENCY DEPT VISIT,LEVEL II: ICD-10-PCS | Mod: ,,, | Performed by: NURSE PRACTITIONER

## 2021-07-04 PROCEDURE — 96372 THER/PROPH/DIAG INJ SC/IM: CPT | Mod: 59

## 2021-07-04 PROCEDURE — 99284 EMERGENCY DEPT VISIT MOD MDM: CPT | Mod: 25

## 2021-07-04 RX ORDER — CAPSAICIN, MENTHOL .025; 1.25 G/100G; G/100G
1 PATCH TOPICAL EVERY 12 HOURS
COMMUNITY
Start: 2021-07-04

## 2021-07-04 RX ORDER — ORPHENADRINE CITRATE 30 MG/ML
60 INJECTION INTRAMUSCULAR; INTRAVENOUS
Status: COMPLETED | OUTPATIENT
Start: 2021-07-04 | End: 2021-07-04

## 2021-07-04 RX ORDER — METHOCARBAMOL 500 MG/1
1000 TABLET, FILM COATED ORAL 3 TIMES DAILY
Qty: 30 TABLET | Refills: 0 | Status: SHIPPED | OUTPATIENT
Start: 2021-07-04 | End: 2021-07-09

## 2021-07-04 RX ORDER — NAPROXEN 375 MG/1
375 TABLET ORAL 2 TIMES DAILY WITH MEALS
Qty: 60 TABLET | Refills: 0 | Status: SHIPPED | OUTPATIENT
Start: 2021-07-04

## 2021-07-04 RX ORDER — DEXAMETHASONE SODIUM PHOSPHATE 4 MG/ML
8 INJECTION, SOLUTION INTRA-ARTICULAR; INTRALESIONAL; INTRAMUSCULAR; INTRAVENOUS; SOFT TISSUE
Status: COMPLETED | OUTPATIENT
Start: 2021-07-04 | End: 2021-07-04

## 2021-07-04 RX ORDER — ORPHENADRINE CITRATE 100 MG/1
100 TABLET, EXTENDED RELEASE ORAL
Status: DISCONTINUED | OUTPATIENT
Start: 2021-07-04 | End: 2021-07-04

## 2021-07-04 RX ORDER — METHYLPREDNISOLONE 4 MG/1
TABLET ORAL
Qty: 1 PACKAGE | Refills: 0 | Status: SHIPPED | OUTPATIENT
Start: 2021-07-04

## 2021-07-04 RX ORDER — KETOROLAC TROMETHAMINE 30 MG/ML
30 INJECTION, SOLUTION INTRAMUSCULAR; INTRAVENOUS
Status: COMPLETED | OUTPATIENT
Start: 2021-07-04 | End: 2021-07-04

## 2021-07-04 RX ADMIN — ORPHENADRINE CITRATE 60 MG: 60 INJECTION INTRAMUSCULAR; INTRAVENOUS at 07:07

## 2021-07-04 RX ADMIN — DEXAMETHASONE SODIUM PHOSPHATE 8 MG: 4 INJECTION INTRA-ARTICULAR; INTRALESIONAL; INTRAMUSCULAR; INTRAVENOUS; SOFT TISSUE at 07:07

## 2021-07-04 RX ADMIN — KETOROLAC TROMETHAMINE 30 MG: 30 INJECTION, SOLUTION INTRAMUSCULAR; INTRAVENOUS at 07:07

## 2021-07-14 ENCOUNTER — HOSPITAL ENCOUNTER (OUTPATIENT)
Dept: RADIOLOGY | Facility: OTHER | Age: 46
Discharge: HOME | End: 2021-07-14
Attending: FAMILY MEDICINE
Payer: COMMERCIAL

## 2021-07-14 DIAGNOSIS — M54.50 LUMBAR PAIN: ICD-10-CM

## 2021-07-14 DIAGNOSIS — M54.50 LUMBAR PAIN: Primary | ICD-10-CM

## 2021-07-14 PROCEDURE — 72100 X-RAY EXAM L-S SPINE 2/3 VWS: CPT | Mod: TC,FY

## 2021-07-14 PROCEDURE — 72100 XR LUMBAR SPINE AP AND LATERAL: ICD-10-PCS | Mod: 26,,, | Performed by: RADIOLOGY

## 2021-07-14 PROCEDURE — 72100 X-RAY EXAM L-S SPINE 2/3 VWS: CPT | Mod: 26,,, | Performed by: RADIOLOGY

## 2021-12-27 ENCOUNTER — OFFICE VISIT (OUTPATIENT)
Dept: URGENT CARE | Facility: CLINIC | Age: 46
End: 2021-12-27
Payer: COMMERCIAL

## 2021-12-27 ENCOUNTER — PATIENT MESSAGE (OUTPATIENT)
Dept: ADMINISTRATIVE | Facility: OTHER | Age: 46
End: 2021-12-27
Payer: COMMERCIAL

## 2021-12-27 VITALS
SYSTOLIC BLOOD PRESSURE: 136 MMHG | HEART RATE: 83 BPM | WEIGHT: 234 LBS | HEIGHT: 66 IN | RESPIRATION RATE: 19 BRPM | DIASTOLIC BLOOD PRESSURE: 77 MMHG | TEMPERATURE: 99 F | BODY MASS INDEX: 37.61 KG/M2 | OXYGEN SATURATION: 97 %

## 2021-12-27 DIAGNOSIS — R09.81 SINUS CONGESTION: ICD-10-CM

## 2021-12-27 DIAGNOSIS — R05.9 COUGH: Primary | ICD-10-CM

## 2021-12-27 LAB
CTP QC/QA: YES
SARS-COV-2 RDRP RESP QL NAA+PROBE: NEGATIVE

## 2021-12-27 PROCEDURE — 1159F MED LIST DOCD IN RCRD: CPT | Mod: CPTII,S$GLB,, | Performed by: PHYSICIAN ASSISTANT

## 2021-12-27 PROCEDURE — 99213 PR OFFICE/OUTPT VISIT, EST, LEVL III, 20-29 MIN: ICD-10-PCS | Mod: S$GLB,,, | Performed by: PHYSICIAN ASSISTANT

## 2021-12-27 PROCEDURE — 3075F SYST BP GE 130 - 139MM HG: CPT | Mod: CPTII,S$GLB,, | Performed by: PHYSICIAN ASSISTANT

## 2021-12-27 PROCEDURE — 3075F PR MOST RECENT SYSTOLIC BLOOD PRESS GE 130-139MM HG: ICD-10-PCS | Mod: CPTII,S$GLB,, | Performed by: PHYSICIAN ASSISTANT

## 2021-12-27 PROCEDURE — 3008F PR BODY MASS INDEX (BMI) DOCUMENTED: ICD-10-PCS | Mod: CPTII,S$GLB,, | Performed by: PHYSICIAN ASSISTANT

## 2021-12-27 PROCEDURE — 3078F DIAST BP <80 MM HG: CPT | Mod: CPTII,S$GLB,, | Performed by: PHYSICIAN ASSISTANT

## 2021-12-27 PROCEDURE — 3078F PR MOST RECENT DIASTOLIC BLOOD PRESSURE < 80 MM HG: ICD-10-PCS | Mod: CPTII,S$GLB,, | Performed by: PHYSICIAN ASSISTANT

## 2021-12-27 PROCEDURE — U0002: ICD-10-PCS | Mod: QW,S$GLB,, | Performed by: PHYSICIAN ASSISTANT

## 2021-12-27 PROCEDURE — 99213 OFFICE O/P EST LOW 20 MIN: CPT | Mod: S$GLB,,, | Performed by: PHYSICIAN ASSISTANT

## 2021-12-27 PROCEDURE — 1160F PR REVIEW ALL MEDS BY PRESCRIBER/CLIN PHARMACIST DOCUMENTED: ICD-10-PCS | Mod: CPTII,S$GLB,, | Performed by: PHYSICIAN ASSISTANT

## 2021-12-27 PROCEDURE — 3008F BODY MASS INDEX DOCD: CPT | Mod: CPTII,S$GLB,, | Performed by: PHYSICIAN ASSISTANT

## 2021-12-27 PROCEDURE — 1159F PR MEDICATION LIST DOCUMENTED IN MEDICAL RECORD: ICD-10-PCS | Mod: CPTII,S$GLB,, | Performed by: PHYSICIAN ASSISTANT

## 2021-12-27 PROCEDURE — 1160F RVW MEDS BY RX/DR IN RCRD: CPT | Mod: CPTII,S$GLB,, | Performed by: PHYSICIAN ASSISTANT

## 2021-12-27 PROCEDURE — U0002 COVID-19 LAB TEST NON-CDC: HCPCS | Mod: QW,S$GLB,, | Performed by: PHYSICIAN ASSISTANT

## 2021-12-27 RX ORDER — CETIRIZINE HYDROCHLORIDE 10 MG/1
10 TABLET ORAL DAILY
Qty: 30 TABLET | Refills: 3 | Status: SHIPPED | OUTPATIENT
Start: 2021-12-27

## 2021-12-27 RX ORDER — FLUTICASONE PROPIONATE 50 MCG
1 SPRAY, SUSPENSION (ML) NASAL 2 TIMES DAILY
Qty: 16 ML | Refills: 3 | Status: SHIPPED | OUTPATIENT
Start: 2021-12-27

## 2021-12-27 RX ORDER — PROMETHAZINE HYDROCHLORIDE AND DEXTROMETHORPHAN HYDROBROMIDE 6.25; 15 MG/5ML; MG/5ML
5 SYRUP ORAL EVERY 6 HOURS PRN
Qty: 118 ML | Refills: 0 | Status: SHIPPED | OUTPATIENT
Start: 2021-12-27

## 2022-04-19 ENCOUNTER — OFFICE VISIT (OUTPATIENT)
Dept: URGENT CARE | Facility: CLINIC | Age: 47
End: 2022-04-19
Payer: COMMERCIAL

## 2022-04-19 VITALS
BODY MASS INDEX: 37.61 KG/M2 | OXYGEN SATURATION: 96 % | TEMPERATURE: 99 F | WEIGHT: 234 LBS | HEART RATE: 93 BPM | RESPIRATION RATE: 18 BRPM | DIASTOLIC BLOOD PRESSURE: 81 MMHG | HEIGHT: 66 IN | SYSTOLIC BLOOD PRESSURE: 138 MMHG

## 2022-04-19 DIAGNOSIS — R11.2 NAUSEA AND VOMITING, INTRACTABILITY OF VOMITING NOT SPECIFIED, UNSPECIFIED VOMITING TYPE: ICD-10-CM

## 2022-04-19 DIAGNOSIS — K52.9 GASTROENTERITIS: Primary | ICD-10-CM

## 2022-04-19 DIAGNOSIS — J02.9 THROAT SORENESS: ICD-10-CM

## 2022-04-19 PROCEDURE — 1159F MED LIST DOCD IN RCRD: CPT | Mod: CPTII,S$GLB,,

## 2022-04-19 PROCEDURE — 96372 PR INJECTION,THERAP/PROPH/DIAG2ST, IM OR SUBCUT: ICD-10-PCS | Mod: S$GLB,,,

## 2022-04-19 PROCEDURE — 1159F PR MEDICATION LIST DOCUMENTED IN MEDICAL RECORD: ICD-10-PCS | Mod: CPTII,S$GLB,,

## 2022-04-19 PROCEDURE — 99214 PR OFFICE/OUTPT VISIT, EST, LEVL IV, 30-39 MIN: ICD-10-PCS | Mod: 25,S$GLB,,

## 2022-04-19 PROCEDURE — 96372 THER/PROPH/DIAG INJ SC/IM: CPT | Mod: S$GLB,,,

## 2022-04-19 PROCEDURE — 99214 OFFICE O/P EST MOD 30 MIN: CPT | Mod: 25,S$GLB,,

## 2022-04-19 PROCEDURE — 3008F BODY MASS INDEX DOCD: CPT | Mod: CPTII,S$GLB,,

## 2022-04-19 PROCEDURE — 3075F SYST BP GE 130 - 139MM HG: CPT | Mod: CPTII,S$GLB,,

## 2022-04-19 PROCEDURE — 3079F PR MOST RECENT DIASTOLIC BLOOD PRESSURE 80-89 MM HG: ICD-10-PCS | Mod: CPTII,S$GLB,,

## 2022-04-19 PROCEDURE — 1160F PR REVIEW ALL MEDS BY PRESCRIBER/CLIN PHARMACIST DOCUMENTED: ICD-10-PCS | Mod: CPTII,S$GLB,,

## 2022-04-19 PROCEDURE — 3079F DIAST BP 80-89 MM HG: CPT | Mod: CPTII,S$GLB,,

## 2022-04-19 PROCEDURE — 3075F PR MOST RECENT SYSTOLIC BLOOD PRESS GE 130-139MM HG: ICD-10-PCS | Mod: CPTII,S$GLB,,

## 2022-04-19 PROCEDURE — 1160F RVW MEDS BY RX/DR IN RCRD: CPT | Mod: CPTII,S$GLB,,

## 2022-04-19 PROCEDURE — 3008F PR BODY MASS INDEX (BMI) DOCUMENTED: ICD-10-PCS | Mod: CPTII,S$GLB,,

## 2022-04-19 RX ORDER — SODIUM CHLORIDE 9 MG/ML
INJECTION, SOLUTION INTRAVENOUS
Status: COMPLETED | OUTPATIENT
Start: 2022-04-19 | End: 2022-04-19

## 2022-04-19 RX ORDER — ONDANSETRON HYDROCHLORIDE 8 MG/1
8 TABLET, FILM COATED ORAL EVERY 8 HOURS PRN
Qty: 30 TABLET | Refills: 0 | Status: SHIPPED | OUTPATIENT
Start: 2022-04-19 | End: 2022-04-29

## 2022-04-19 RX ORDER — ESCITALOPRAM OXALATE 10 MG/1
10 TABLET ORAL DAILY
COMMUNITY
Start: 2021-12-12

## 2022-04-19 RX ORDER — HYDROCHLOROTHIAZIDE 12.5 MG/1
12.5 CAPSULE ORAL DAILY
COMMUNITY

## 2022-04-19 RX ORDER — ONDANSETRON 2 MG/ML
4 INJECTION INTRAMUSCULAR; INTRAVENOUS
Status: COMPLETED | OUTPATIENT
Start: 2022-04-19 | End: 2022-04-19

## 2022-04-19 RX ADMIN — ONDANSETRON 4 MG: 2 INJECTION INTRAMUSCULAR; INTRAVENOUS at 10:04

## 2022-04-19 RX ADMIN — SODIUM CHLORIDE: 9 INJECTION, SOLUTION INTRAVENOUS at 11:04

## 2022-04-19 RX ADMIN — SODIUM CHLORIDE: 9 INJECTION, SOLUTION INTRAVENOUS at 10:04

## 2022-04-19 NOTE — PROGRESS NOTES
"Subjective:       Patient ID: Mateo George is a 46 y.o. male.    Vitals:  height is 5' 6" (1.676 m) and weight is 106.1 kg (234 lb). His temperature is 98.5 °F (36.9 °C). His blood pressure is 138/81 and his pulse is 93. His respiration is 18 and oxygen saturation is 96%.     Chief Complaint: Emesis    47 yo male presents to urgent care for evaluation. Patient c/o nausea and vomiting, abdominal pain, diarrhea since Saturday. States his symptoms started to resolve and then worsened last night. He has been unable to keep down any food or drink. States he tried to take a Zofran ODT yesterday but threw that up as well. Denies fevers, bloody emesis or stools. Notes that his family has all recently had a stomach bug.     Emesis   This is a new problem. The current episode started in the past 7 days. The problem occurs 2 to 4 times per day. The problem has been unchanged. Associated symptoms include abdominal pain and diarrhea. Pertinent negatives include no arthralgias, chest pain, chills, coughing, decreased urine volume, dizziness, fever, headaches, myalgias, sweats, URI or weight loss. Risk factors include ill contacts. Treatments tried: Zofran  The treatment provided no relief.       Constitution: Negative for chills and fever.   Cardiovascular: Negative for chest pain.   Respiratory: Negative for cough.    Gastrointestinal: Positive for abdominal pain, nausea, vomiting and diarrhea.   Genitourinary: Negative for urine decreased.   Musculoskeletal: Negative for joint pain and muscle ache.   Neurological: Negative for dizziness and headaches.       Objective:      Physical Exam   Constitutional: He is oriented to person, place, and time. He appears well-developed. He appears ill.      Comments:Orthostatics positive. IM Zofran and 1 L NS given in clinic. Patient reported improvement in symptoms after completion of treament   HENT:   Head: Normocephalic and atraumatic.   Ears:   Right Ear: External ear normal.   Left " "Ear: External ear normal.   Nose: Nose normal.   Mouth/Throat: Mucous membranes are normal.   Eyes: Conjunctivae and lids are normal.   Neck: Trachea normal. Neck supple.   Cardiovascular: Normal rate, regular rhythm and normal heart sounds.   Pulmonary/Chest: Effort normal and breath sounds normal. No respiratory distress.   Abdominal: Normal appearance and bowel sounds are normal. He exhibits no distension, no abdominal bruit, no pulsatile midline mass and no mass. Soft. There is no abdominal tenderness.   Musculoskeletal: Normal range of motion.         General: Normal range of motion.   Neurological: He is alert and oriented to person, place, and time. He has normal strength.   Skin: Skin is warm, dry, intact, not diaphoretic and not pale. Capillary refill takes less than 2 seconds.   Psychiatric: His speech is normal and behavior is normal. Judgment and thought content normal.   Nursing note and vitals reviewed.      Vitals:    04/19/22 0937 04/19/22 1018 04/19/22 1019 04/19/22 1020   BP: (!) 153/85 (!) 160/80 (!) 144/80 138/81   Patient Position:  Lying  Standing   Pulse: 83 74 79 93   Resp: 18 18  18   Temp: 98.5 °F (36.9 °C)      SpO2: 98% 96% 97% 96%   Weight: 106.1 kg (234 lb)      Height: 5' 6" (1.676 m)            Assessment:       1. Gastroenteritis    2. Nausea and vomiting, intractability of vomiting not specified, unspecified vomiting type    3. Throat soreness          Plan:         No acute abdomen on physical exam. Vitals stable. Discussed BRAT diet for symptoms. Zofran given in clinic for nausea symptoms. 1 L NS given in clinic due to positive orthostatics. Sent prescription for zofran for GI symptoms. Discussed side effects of medications. Recommended patient drink plenty of fluids. Return to clinic if symptoms do not resolve in the next 72 hours. ER precautions given including: fever, abdominal distention, bloody diarrhea, nonintractable vomiting/diarrhea. Discussed signs of dehydration with " patient. Patient verbalized understanding and agrees with treatment plan.       Gastroenteritis    Nausea and vomiting, intractability of vomiting not specified, unspecified vomiting type  -     ondansetron injection 4 mg  -     Orthostatic vital signs  -     0.9%  NaCl infusion  -     0.9%  NaCl infusion  -     ondansetron (ZOFRAN) 8 MG tablet; Take 1 tablet (8 mg total) by mouth every 8 (eight) hours as needed for Nausea.  Dispense: 30 tablet; Refill: 0    Throat soreness  -     (Magic mouthwash) 1:1:1 diphenhydramine(Benadryl) 12.5mg/5ml liq, aluminum & magnesium hydroxide-simethicone (Maalox), LIDOcaine viscous 2%; Swish and spit 10 mLs every 4 (four) hours as needed. for mouth sores  Dispense: 420 mL; Refill: 0         Patient Instructions   PLEASE READ YOUR DISCHARGE INSTRUCTIONS ENTIRELY AS IT CONTAINS IMPORTANT INFORMATION.    Take the zofran for nausea (it dissolves under your tongue) every 8-12 hours    Use gatorade/pedialyte or rehydration packets to help stay hydrated. Vitamin water and plain water do not contain rehydrating electrolytes.  Increase clear liquids (water, gatorade, pedialyte, broths, jello, etc) Hold off on solids for 12-18 hours. Then advance to BRAT diet (banana, rice, applesauce, tea, toast/crackers), then advance further as tolerated. Avoid spicy or fatty foods.   Use Peptobismol to help alleviate your diarrhea symptoms.     Avoid imodium unless you have more than 6 loose stools in 24 hours.       Wash hands frequently while sick. Avoid ibuprofen or other NSAIDS until you are well.     Please go to the ER if you experience worsening pain, blood in your vomit or stool, high fever, dizziness, fainting, swelling of your abdomen, inability to pass gas or stool.           Please arrange follow up with your primary medical clinic as soon as possible. You must understand that you've received an Urgent Care treatment only and that you may be released before all of your medical problems are  known or treated. You, the patient, will arrange for follow up as instructed. If your symptoms worsen or fail to improve you should go to the Emergency Room.  WE CANNOT RULE OUT ALL POSSIBLE CAUSES OF YOUR SYMPTOMS IN THE URGENT CARE SETTING PLEASE GO TO THE ER IF YOU FEELS YOUR CONDITION IS WORSENING OR YOU WOULD LIKE EMERGENT EVALUATION.

## 2022-04-19 NOTE — LETTER
April 19, 2022      [facility] [place]  [address]  [place] [address]  Phone: [phone]  Fax: [phone]       Patient: Mateo George   YOB: 1975  Date of Visit: 04/19/2022    To Whom It May Concern:    Jed George  was at [facility] on 04/19/2022. The patient may return to work/school on 4/23/2022 with no restrictions OR sooner IF symptoms are improving and fever free for 24 hours (fever is 100.4F or greater)   . If you have any questions or concerns, or if I can be of further assistance, please do not hesitate to contact me.    Sincerely,          Sandra Berrios PA-C

## 2022-04-19 NOTE — PATIENT INSTRUCTIONS
PLEASE READ YOUR DISCHARGE INSTRUCTIONS ENTIRELY AS IT CONTAINS IMPORTANT INFORMATION.    Take the zofran for nausea (it dissolves under your tongue) every 8-12 hours    Use gatorade/pedialyte or rehydration packets to help stay hydrated. Vitamin water and plain water do not contain rehydrating electrolytes.  Increase clear liquids (water, gatorade, pedialyte, broths, jello, etc) Hold off on solids for 12-18 hours. Then advance to BRAT diet (banana, rice, applesauce, tea, toast/crackers), then advance further as tolerated. Avoid spicy or fatty foods.   Use Peptobismol to help alleviate your diarrhea symptoms.     Avoid imodium unless you have more than 6 loose stools in 24 hours.       Wash hands frequently while sick. Avoid ibuprofen or other NSAIDS until you are well.     Please go to the ER if you experience worsening pain, blood in your vomit or stool, high fever, dizziness, fainting, swelling of your abdomen, inability to pass gas or stool.           Please arrange follow up with your primary medical clinic as soon as possible. You must understand that you've received an Urgent Care treatment only and that you may be released before all of your medical problems are known or treated. You, the patient, will arrange for follow up as instructed. If your symptoms worsen or fail to improve you should go to the Emergency Room.  WE CANNOT RULE OUT ALL POSSIBLE CAUSES OF YOUR SYMPTOMS IN THE URGENT CARE SETTING PLEASE GO TO THE ER IF YOU FEELS YOUR CONDITION IS WORSENING OR YOU WOULD LIKE EMERGENT EVALUATION.

## 2022-05-06 ENCOUNTER — HOSPITAL ENCOUNTER (EMERGENCY)
Facility: HOSPITAL | Age: 47
Discharge: HOME | End: 2022-05-06
Attending: EMERGENCY MEDICINE
Payer: COMMERCIAL

## 2022-05-06 VITALS
TEMPERATURE: 99 F | RESPIRATION RATE: 18 BRPM | OXYGEN SATURATION: 96 % | DIASTOLIC BLOOD PRESSURE: 72 MMHG | SYSTOLIC BLOOD PRESSURE: 146 MMHG | HEART RATE: 86 BPM

## 2022-05-06 DIAGNOSIS — U07.1 COVID-19: Primary | ICD-10-CM

## 2022-05-06 DIAGNOSIS — E86.0 DEHYDRATION: ICD-10-CM

## 2022-05-06 DIAGNOSIS — R07.9 CHEST PAIN: ICD-10-CM

## 2022-05-06 DIAGNOSIS — R11.2 NON-INTRACTABLE VOMITING WITH NAUSEA, UNSPECIFIED VOMITING TYPE: ICD-10-CM

## 2022-05-06 LAB
ALBUMIN SERPL BCP-MCNC: 4.8 G/DL (ref 3.5–5.2)
ALP SERPL-CCNC: 146 U/L (ref 55–135)
ALT SERPL W/O P-5'-P-CCNC: 159 U/L (ref 10–44)
ANION GAP SERPL CALC-SCNC: 13 MMOL/L (ref 8–16)
AST SERPL-CCNC: 57 U/L (ref 10–40)
BASOPHILS # BLD AUTO: 0.01 K/UL (ref 0–0.2)
BASOPHILS NFR BLD: 0.1 % (ref 0–1.9)
BILIRUB SERPL-MCNC: 1 MG/DL (ref 0.1–1)
BUN SERPL-MCNC: 21 MG/DL (ref 6–20)
CALCIUM SERPL-MCNC: 10.6 MG/DL (ref 8.7–10.5)
CHLORIDE SERPL-SCNC: 99 MMOL/L (ref 95–110)
CK SERPL-CCNC: 51 U/L (ref 20–200)
CO2 SERPL-SCNC: 24 MMOL/L (ref 23–29)
CREAT SERPL-MCNC: 1.1 MG/DL (ref 0.5–1.4)
CRP SERPL-MCNC: 14.1 MG/L (ref 0–8.2)
DIFFERENTIAL METHOD: ABNORMAL
EOSINOPHIL # BLD AUTO: 0 K/UL (ref 0–0.5)
EOSINOPHIL NFR BLD: 0 % (ref 0–8)
ERYTHROCYTE [DISTWIDTH] IN BLOOD BY AUTOMATED COUNT: 13.1 % (ref 11.5–14.5)
EST. GFR  (AFRICAN AMERICAN): >60 ML/MIN/1.73 M^2
EST. GFR  (NON AFRICAN AMERICAN): >60 ML/MIN/1.73 M^2
FERRITIN SERPL-MCNC: 290 NG/ML (ref 20–300)
GLUCOSE SERPL-MCNC: 169 MG/DL (ref 70–110)
HCT VFR BLD AUTO: 52.3 % (ref 40–54)
HGB BLD-MCNC: 17.6 G/DL (ref 14–18)
IMM GRANULOCYTES # BLD AUTO: 0.02 K/UL (ref 0–0.04)
IMM GRANULOCYTES NFR BLD AUTO: 0.3 % (ref 0–0.5)
LACTATE SERPL-SCNC: 1.8 MMOL/L (ref 0.5–2.2)
LACTATE SERPL-SCNC: 2.6 MMOL/L (ref 0.5–2.2)
LDH SERPL L TO P-CCNC: 348 U/L (ref 110–260)
LYMPHOCYTES # BLD AUTO: 0.6 K/UL (ref 1–4.8)
LYMPHOCYTES NFR BLD: 7.8 % (ref 18–48)
MCH RBC QN AUTO: 29 PG (ref 27–31)
MCHC RBC AUTO-ENTMCNC: 33.7 G/DL (ref 32–36)
MCV RBC AUTO: 86 FL (ref 82–98)
MONOCYTES # BLD AUTO: 0.3 K/UL (ref 0.3–1)
MONOCYTES NFR BLD: 4.2 % (ref 4–15)
NEUTROPHILS # BLD AUTO: 6.8 K/UL (ref 1.8–7.7)
NEUTROPHILS NFR BLD: 87.6 % (ref 38–73)
NRBC BLD-RTO: 0 /100 WBC
PLATELET # BLD AUTO: 204 K/UL (ref 150–450)
PMV BLD AUTO: 12.4 FL (ref 9.2–12.9)
POTASSIUM SERPL-SCNC: 4.8 MMOL/L (ref 3.5–5.1)
PROT SERPL-MCNC: 8.9 G/DL (ref 6–8.4)
RBC # BLD AUTO: 6.06 M/UL (ref 4.6–6.2)
SODIUM SERPL-SCNC: 136 MMOL/L (ref 136–145)
TROPONIN I SERPL DL<=0.01 NG/ML-MCNC: <0.006 NG/ML (ref 0–0.03)
TROPONIN I SERPL DL<=0.01 NG/ML-MCNC: <0.006 NG/ML (ref 0–0.03)
WBC # BLD AUTO: 7.79 K/UL (ref 3.9–12.7)

## 2022-05-06 PROCEDURE — 83615 LACTATE (LD) (LDH) ENZYME: CPT | Performed by: PHYSICIAN ASSISTANT

## 2022-05-06 PROCEDURE — 82728 ASSAY OF FERRITIN: CPT | Performed by: PHYSICIAN ASSISTANT

## 2022-05-06 PROCEDURE — 93005 ELECTROCARDIOGRAM TRACING: CPT

## 2022-05-06 PROCEDURE — 96361 HYDRATE IV INFUSION ADD-ON: CPT

## 2022-05-06 PROCEDURE — 96375 TX/PRO/DX INJ NEW DRUG ADDON: CPT

## 2022-05-06 PROCEDURE — 82550 ASSAY OF CK (CPK): CPT | Performed by: PHYSICIAN ASSISTANT

## 2022-05-06 PROCEDURE — 99284 EMERGENCY DEPT VISIT MOD MDM: CPT | Mod: ,,, | Performed by: PHYSICIAN ASSISTANT

## 2022-05-06 PROCEDURE — 99284 PR EMERGENCY DEPT VISIT,LEVEL IV: ICD-10-PCS | Mod: ,,, | Performed by: PHYSICIAN ASSISTANT

## 2022-05-06 PROCEDURE — 86140 C-REACTIVE PROTEIN: CPT | Performed by: PHYSICIAN ASSISTANT

## 2022-05-06 PROCEDURE — 83605 ASSAY OF LACTIC ACID: CPT | Performed by: PHYSICIAN ASSISTANT

## 2022-05-06 PROCEDURE — 84484 ASSAY OF TROPONIN QUANT: CPT | Mod: 91 | Performed by: PHYSICIAN ASSISTANT

## 2022-05-06 PROCEDURE — 63600175 PHARM REV CODE 636 W HCPCS: Performed by: PHYSICIAN ASSISTANT

## 2022-05-06 PROCEDURE — 84484 ASSAY OF TROPONIN QUANT: CPT

## 2022-05-06 PROCEDURE — 99285 EMERGENCY DEPT VISIT HI MDM: CPT | Mod: 25

## 2022-05-06 PROCEDURE — 93010 EKG 12-LEAD: ICD-10-PCS | Mod: ,,, | Performed by: INTERNAL MEDICINE

## 2022-05-06 PROCEDURE — 80053 COMPREHEN METABOLIC PANEL: CPT | Performed by: PHYSICIAN ASSISTANT

## 2022-05-06 PROCEDURE — 96374 THER/PROPH/DIAG INJ IV PUSH: CPT

## 2022-05-06 PROCEDURE — 93010 ELECTROCARDIOGRAM REPORT: CPT | Mod: ,,, | Performed by: INTERNAL MEDICINE

## 2022-05-06 PROCEDURE — 85025 COMPLETE CBC W/AUTO DIFF WBC: CPT | Performed by: PHYSICIAN ASSISTANT

## 2022-05-06 RX ORDER — ONDANSETRON 2 MG/ML
4 INJECTION INTRAMUSCULAR; INTRAVENOUS
Status: COMPLETED | OUTPATIENT
Start: 2022-05-06 | End: 2022-05-06

## 2022-05-06 RX ORDER — DROPERIDOL 2.5 MG/ML
0.62 INJECTION, SOLUTION INTRAMUSCULAR; INTRAVENOUS ONCE
Status: COMPLETED | OUTPATIENT
Start: 2022-05-06 | End: 2022-05-06

## 2022-05-06 RX ORDER — DROPERIDOL 2.5 MG/ML
0.62 INJECTION, SOLUTION INTRAMUSCULAR; INTRAVENOUS ONCE
Status: DISCONTINUED | OUTPATIENT
Start: 2022-05-06 | End: 2022-05-06 | Stop reason: HOSPADM

## 2022-05-06 RX ORDER — PROMETHAZINE HYDROCHLORIDE 25 MG/1
25 TABLET ORAL EVERY 6 HOURS PRN
Qty: 12 TABLET | Refills: 0 | Status: SHIPPED | OUTPATIENT
Start: 2022-05-06 | End: 2022-05-09

## 2022-05-06 RX ADMIN — SODIUM CHLORIDE, SODIUM LACTATE, POTASSIUM CHLORIDE, AND CALCIUM CHLORIDE 1000 ML: .6; .31; .03; .02 INJECTION, SOLUTION INTRAVENOUS at 04:05

## 2022-05-06 RX ADMIN — ONDANSETRON 4 MG: 2 INJECTION INTRAMUSCULAR; INTRAVENOUS at 01:05

## 2022-05-06 RX ADMIN — SODIUM CHLORIDE, SODIUM LACTATE, POTASSIUM CHLORIDE, AND CALCIUM CHLORIDE 1000 ML: .6; .31; .03; .02 INJECTION, SOLUTION INTRAVENOUS at 01:05

## 2022-05-06 RX ADMIN — DROPERIDOL 0.62 MG: 2.5 INJECTION, SOLUTION INTRAMUSCULAR; INTRAVENOUS at 03:05

## 2022-05-06 NOTE — ED TRIAGE NOTES
Mateo George, a 46 y.o. male presents to the ED w/ complaint of COVID concerns. Pt states that he tested positive on Tuesday. Pt states that he woke up this morning and took Zofran and diabetes meds and threw them up around 5am. Pt reports nausea, vomiting (8 episodes today), dizziness, diarrhea. Pt reports pressure in chest starting today around 8am (pain 4/5) but denies SOB; reports runny nose, sweats. Denies fever. States that he hasn't eaten since yesterday.    Triage note:  Chief Complaint   Patient presents with    COVID-19 Concerns     COVID + on Tuesday, vomiting, unable to take medication      Review of patient's allergies indicates:  No Known Allergies  Past Medical History:   Diagnosis Date    Hyperlipidemia     Hypertension     Prediabetes      LOC: The patient is awake, alert, and oriented to self, place, time, and situation. Pt is calm and cooperative. Affect is appropriate.  Speech is appropriate and clear.     APPEARANCE: Patient resting comfortably in no acute distress.  Patient is clean and well groomed.    SKIN: The skin is warm and dry; color consistent with ethnicity.  Patient has normal skin turgor and moist mucus membranes.  Skin intact; no breakdown or bruising noted.     MUSCULOSKELETAL: Patient moving upper and lower extremities without difficulty; denies pain in the extremities or back.  Reports weakness.     RESPIRATORY: Airway is open and patent. Respirations spontaneous, even, easy, and non-labored.  Patient has a normal effort and rate.  No accessory muscle use noted. Denies cough.     CARDIAC:  Normal rate noted.  No peripheral edema noted. No complaints of chest pain.      ABDOMEN: Soft and non tender to palpation.  No distention noted. Pt denies abdominal pain; reports nausea, vomiting, diarrhea.    NEUROLOGIC: Eyes open spontaneously.  Behavior appropriate to situation.  Follows commands; facial expression symmetrical.  Purposeful motor response noted; normal sensation in all  extremities. Pt denies headache; reports lightheadedness or dizziness; denies visual disturbances; denies loss of balance; denies unilateral weakness.

## 2022-05-07 NOTE — DISCHARGE INSTRUCTIONS
You were seen in the emergency department for nausea vomiting.  As well as chest pain.  Your EKG and cardiac enzymes were normal.  Please follow-up with your PCP.  Have prescribed you some more medications for nausea.  Please monitor your symptoms and return to the ED for any new or worsening symptoms.

## 2024-05-03 ENCOUNTER — OFFICE VISIT (OUTPATIENT)
Dept: URGENT CARE | Facility: CLINIC | Age: 49
End: 2024-05-03
Payer: COMMERCIAL

## 2024-05-03 VITALS
RESPIRATION RATE: 18 BRPM | WEIGHT: 234 LBS | BODY MASS INDEX: 37.61 KG/M2 | HEIGHT: 66 IN | TEMPERATURE: 99 F | OXYGEN SATURATION: 98 % | HEART RATE: 84 BPM | DIASTOLIC BLOOD PRESSURE: 91 MMHG | SYSTOLIC BLOOD PRESSURE: 143 MMHG

## 2024-05-03 DIAGNOSIS — R11.0 NAUSEA: Primary | ICD-10-CM

## 2024-05-03 DIAGNOSIS — R11.10 VOMITING, UNSPECIFIED VOMITING TYPE, UNSPECIFIED WHETHER NAUSEA PRESENT: ICD-10-CM

## 2024-05-03 LAB
CTP QC/QA: YES
SARS-COV-2 AG RESP QL IA.RAPID: NEGATIVE

## 2024-05-03 PROCEDURE — 99214 OFFICE O/P EST MOD 30 MIN: CPT | Mod: 25,S$GLB,, | Performed by: NURSE PRACTITIONER

## 2024-05-03 PROCEDURE — 87811 SARS-COV-2 COVID19 W/OPTIC: CPT | Mod: QW,S$GLB,, | Performed by: NURSE PRACTITIONER

## 2024-05-03 PROCEDURE — 96372 THER/PROPH/DIAG INJ SC/IM: CPT | Mod: S$GLB,,, | Performed by: NURSE PRACTITIONER

## 2024-05-03 RX ORDER — ONDANSETRON HYDROCHLORIDE 8 MG/1
8 TABLET, FILM COATED ORAL EVERY 8 HOURS PRN
Qty: 21 TABLET | Refills: 0 | Status: SHIPPED | OUTPATIENT
Start: 2024-05-03

## 2024-05-03 RX ORDER — ONDANSETRON HYDROCHLORIDE 2 MG/ML
8 INJECTION, SOLUTION INTRAVENOUS
Status: COMPLETED | OUTPATIENT
Start: 2024-05-03 | End: 2024-05-03

## 2024-05-03 RX ADMIN — ONDANSETRON HYDROCHLORIDE 8 MG: 2 INJECTION, SOLUTION INTRAVENOUS at 09:05

## 2024-05-03 NOTE — PROGRESS NOTES
"Subjective:      Patient ID: Mateo George is a 48 y.o. male.    Vitals:  height is 5' 5.98" (1.676 m) and weight is 106.1 kg (234 lb). His oral temperature is 98.7 °F (37.1 °C). His blood pressure is 143/91 (abnormal) and his pulse is 84. His respiration is 18 and oxygen saturation is 98%.     Chief Complaint: Nausea    This is a 48 y.o. male who presents today with a chief complaint of nausea, vomiting, nasal drip, lightheadedness and fatigue. Patient states that he has been feeling bad on and off since last Friday. Patient has good and bad days, this has been a chronic problem since he was in high school. Pt states he has seen gastroenterology with no real diagnosis.     Nausea  This is a new problem. The current episode started in the past 7 days. The problem occurs constantly. The problem has been gradually worsening. Associated symptoms include fatigue, headaches, nausea and vomiting. Nothing aggravates the symptoms. He has tried nothing for the symptoms. The treatment provided no relief.       Constitution: Positive for fatigue.   Gastrointestinal:  Positive for nausea and vomiting.   Neurological:  Positive for headaches.      Objective:     Physical Exam   Constitutional: He is oriented to person, place, and time.   HENT:   Head: Normocephalic.   Ears:   Right Ear: External ear normal.   Left Ear: External ear normal.   Nose: Nose normal.   Mouth/Throat: Mucous membranes are moist.   Eyes: Conjunctivae are normal.   Cardiovascular: Normal rate.   Pulmonary/Chest: Effort normal.   Abdominal: Bowel sounds are normal. Soft. There is generalized abdominal tenderness. There is no rebound, no guarding, no left CVA tenderness and no right CVA tenderness.   Musculoskeletal: Normal range of motion.         General: Normal range of motion.   Neurological: He is alert and oriented to person, place, and time.   Skin: Skin is dry.   Psychiatric: His behavior is normal.   Nursing note and vitals reviewed.    Results for " orders placed or performed in visit on 05/03/24   SARS Coronavirus 2 Antigen, POCT Manual Read   Result Value Ref Range    SARS Coronavirus 2 Antigen Negative Negative     Acceptable Yes        Assessment:     1. Nausea    2. Vomiting, unspecified vomiting type, unspecified whether nausea present        Plan:       Nausea  -     SARS Coronavirus 2 Antigen, POCT Manual Read  -     ondansetron injection 8 mg  -     ondansetron (ZOFRAN) 8 MG tablet; Take 1 tablet (8 mg total) by mouth every 8 (eight) hours as needed for Nausea.  Dispense: 21 tablet; Refill: 0  -     Ambulatory referral/consult to Gastroenterology    Vomiting, unspecified vomiting type, unspecified whether nausea present      Patient Instructions   - You must understand that you have received an Urgent Care treatment only and that you may be released before all of your medical problems are known or treated.   - You, the patient, will arrange for follow up care as instructed.   - If your condition worsens or fails to improve we recommend that you receive another evaluation at the ER immediately or contact your PCP to discuss your concerns.   - You can call [phone] or [phone] to help schedule an appointment with the appropriate provider.    PLEASE READ YOUR DISCHARGE INSTRUCTIONS ENTIRELY AS IT CONTAINS IMPORTANT INFORMATION.     Take the zofran for nausea (it dissolves under your tongue)      Use gatorade/pedialyte or rehydration packets to help stay hydrated. Vitamin water and plain water do not contain rehydrating electrolytes.    Increase clear liquids (water, gatorade, pedialyte, broths, jello, etc) Hold off on solids for 12-18 hours. Then advance to BRAT diet (banana, rice, applesauce, tea, toast/crackers), then advance further as tolerated. Avoid spicy or fatty foods.     Use Peptobismol to help alleviate your diarrhea symptoms.      Avoid imodium unless you have more than 6 loose stools in 24 hours.      Wash hands  frequently while sick. Avoid ibuprofen or other NSAIDS until you are well.      Please go to the ER if you experience worsening pain, blood in your vomit or stool, high fever, dizziness, fainting, swelling of your abdomen, inability to pass gas or stool.

## 2024-05-03 NOTE — PATIENT INSTRUCTIONS
- You must understand that you have received an Urgent Care treatment only and that you may be released before all of your medical problems are known or treated.   - You, the patient, will arrange for follow up care as instructed.   - If your condition worsens or fails to improve we recommend that you receive another evaluation at the ER immediately or contact your PCP to discuss your concerns.   - You can call [phone] or [phone] to help schedule an appointment with the appropriate provider.    PLEASE READ YOUR DISCHARGE INSTRUCTIONS ENTIRELY AS IT CONTAINS IMPORTANT INFORMATION.     Take the zofran for nausea (it dissolves under your tongue)      Use gatorade/pedialyte or rehydration packets to help stay hydrated. Vitamin water and plain water do not contain rehydrating electrolytes.    Increase clear liquids (water, gatorade, pedialyte, broths, jello, etc) Hold off on solids for 12-18 hours. Then advance to BRAT diet (banana, rice, applesauce, tea, toast/crackers), then advance further as tolerated. Avoid spicy or fatty foods.     Use Peptobismol to help alleviate your diarrhea symptoms.      Avoid imodium unless you have more than 6 loose stools in 24 hours.      Wash hands frequently while sick. Avoid ibuprofen or other NSAIDS until you are well.      Please go to the ER if you experience worsening pain, blood in your vomit or stool, high fever, dizziness, fainting, swelling of your abdomen, inability to pass gas or stool.

## 2024-05-03 NOTE — LETTER
May 3, 2024      [facility] [place]  [address] [place] [address]  Phone: [phone]  Fax: [phone]       Patient: Mateo George   YOB: 1975  Date of Visit: 05/03/2024    To Whom It May Concern:    Jed George  was at [facility] on 05/03/2024. The patient may return to work/school on 05/04/2024 with no restrictions. If you have any questions or concerns, or if I can be of further assistance, please do not hesitate to contact me.    Sincerely,    Britney Youssef NP

## 2024-11-03 ENCOUNTER — OFFICE VISIT (OUTPATIENT)
Dept: URGENT CARE | Facility: CLINIC | Age: 49
End: 2024-11-03
Payer: COMMERCIAL

## 2024-11-03 VITALS
WEIGHT: 234 LBS | DIASTOLIC BLOOD PRESSURE: 85 MMHG | HEIGHT: 66 IN | OXYGEN SATURATION: 98 % | SYSTOLIC BLOOD PRESSURE: 143 MMHG | RESPIRATION RATE: 18 BRPM | HEART RATE: 82 BPM | TEMPERATURE: 100 F | BODY MASS INDEX: 37.61 KG/M2

## 2024-11-03 DIAGNOSIS — M25.552 LEFT HIP PAIN: Primary | ICD-10-CM

## 2024-11-03 PROBLEM — E55.9 VITAMIN D DEFICIENCY: Status: ACTIVE | Noted: 2024-07-24

## 2024-11-03 PROBLEM — R73.03 PREDIABETES: Status: ACTIVE | Noted: 2024-10-24

## 2024-11-03 PROCEDURE — 96372 THER/PROPH/DIAG INJ SC/IM: CPT | Mod: S$GLB,,, | Performed by: SURGERY

## 2024-11-03 PROCEDURE — 73502 X-RAY EXAM HIP UNI 2-3 VIEWS: CPT | Mod: LT,S$GLB,, | Performed by: RADIOLOGY

## 2024-11-03 PROCEDURE — 99213 OFFICE O/P EST LOW 20 MIN: CPT | Mod: 25,S$GLB,, | Performed by: SURGERY

## 2024-11-03 RX ORDER — DEXAMETHASONE SODIUM PHOSPHATE 10 MG/ML
10 INJECTION INTRAMUSCULAR; INTRAVENOUS ONCE
Status: COMPLETED | OUTPATIENT
Start: 2024-11-03 | End: 2024-11-03

## 2024-11-03 RX ORDER — MELOXICAM 15 MG/1
15 TABLET ORAL DAILY
Qty: 15 TABLET | Refills: 0 | Status: SHIPPED | OUTPATIENT
Start: 2024-11-03 | End: 2024-11-18

## 2024-11-03 RX ADMIN — DEXAMETHASONE SODIUM PHOSPHATE 10 MG: 10 INJECTION INTRAMUSCULAR; INTRAVENOUS at 09:11

## 2024-11-03 NOTE — PROGRESS NOTES
"Subjective:      Patient ID: Mateo George is a 48 y.o. male.    Vitals:  height is 5' 6" (1.676 m) and weight is 106.1 kg (234 lb). His oral temperature is 99.7 °F (37.6 °C). His blood pressure is 143/85 (abnormal) and his pulse is 82. His respiration is 18 and oxygen saturation is 98%.     Chief Complaint: Leg Pain    This is a 48 y.o. male who presents today with a chief complaint of left leg pain onset Thursday. Pt states pain is where leg meets hip. Radiates to knee. No known injury mechanism. Pt works at grocery store, picks up heavy boxes regularly. Describes pain as heavy shooting pain. Pain is aggravated and radiates when walking or standing on leg. States he feels tingling in calf, even though pain does not radiate below knee. Pt took ibruprufen with no relief.    Leg Pain   There was no injury mechanism. The pain is present in the left leg. The quality of the pain is described as shooting. The pain is at a severity of 8/10. The pain is severe. The pain has been Intermittent since onset. Associated symptoms include an inability to bear weight, a loss of motion, muscle weakness (right leg) and tingling (calf). Pertinent negatives include no loss of sensation or numbness. He reports no foreign bodies present. The symptoms are aggravated by weight bearing and movement. He has tried NSAIDs for the symptoms. The treatment provided no relief.     Neurological:  Negative for numbness.      Objective:     Physical Exam   Constitutional: He is oriented to person, place, and time. He appears well-developed. He is cooperative. No distress.   HENT:   Head: Normocephalic and atraumatic.   Nose: Nose normal.   Mouth/Throat: Oropharynx is clear and moist and mucous membranes are normal.   Eyes: Conjunctivae and lids are normal.   Neck: Trachea normal and phonation normal. Neck supple.   Cardiovascular: Normal rate, regular rhythm, normal heart sounds and normal pulses.   Pulmonary/Chest: Effort normal and breath sounds " normal.   Abdominal: Normal appearance and bowel sounds are normal. He exhibits no mass. Soft.   Musculoskeletal:         General: No deformity.      Right hip: Normal.      Left hip: He exhibits tenderness. He exhibits normal range of motion.        Legs:    Neurological: He is alert and oriented to person, place, and time. He has normal strength and normal reflexes. No sensory deficit.   Skin: Skin is warm, dry, intact and not diaphoretic.   Psychiatric: His speech is normal and behavior is normal. Judgment and thought content normal.   Nursing note and vitals reviewed.      Assessment:     1. Left hip pain        Plan:       Left hip pain  -     dexAMETHasone injection 10 mg  -     meloxicam (MOBIC) 15 MG tablet; Take 1 tablet (15 mg total) by mouth once daily. for 15 days  Dispense: 15 tablet; Refill: 0  -     X-Ray Hip 2 or 3 views Left with Pelvis when performed; Future; Expected date: 11/03/2024  -     Ambulatory referral/consult to Orthopedics      Pt went to [facility] for [facility]rays. Will call with reading    X-Ray Hip 2 or 3 views Left with Pelvis when performed    Result Date: 11/3/2024  EXAMINATION: XR HIP WITH PELVIS WHEN PERFORMED 2 OR 3 VIEWS LEFT CLINICAL HISTORY: Pain in left hip TECHNIQUE: AP view of the pelvis and frog leg lateral view of the left hip were performed. COMPARISON: CT October 21, 2019 FINDINGS: No displaced fracture or subluxation.  No suspicious bone lesion. Mild hip joint space narrowing. Unremarkable soft tissues.     No acute abnormality. Electronically signed by: Carl Winston MD Date:    11/03/2024 Time:    10:46       I called pt with xray reading. No change in plan. Follow up with ortho if symptoms persist

## 2024-11-03 NOTE — LETTER
November 3, 2024      [facility] [place]  [address] [place] [address]  Phone: [phone]  Fax: [phone]       Patient: Mateo George   YOB: 1975  Date of Visit: 11/03/2024    To Whom It May Concern:    Jed George  was at [facility] on 11/03/2024. The patient may return to work/school on 11/4/2024 with no restrictions. If you have any questions or concerns, or if I can be of further assistance, please do not hesitate to contact me.    Sincerely,      Niki Guzman MD

## 2025-01-14 ENCOUNTER — HOSPITAL ENCOUNTER (OUTPATIENT)
Facility: HOSPITAL | Age: 50
Discharge: HOME | End: 2025-01-15
Attending: EMERGENCY MEDICINE
Payer: COMMERCIAL

## 2025-01-14 DIAGNOSIS — N39.0 COMPLICATED UTI (URINARY TRACT INFECTION): Primary | ICD-10-CM

## 2025-01-14 DIAGNOSIS — R07.9 CHEST PAIN: ICD-10-CM

## 2025-01-14 DIAGNOSIS — N17.9 AKI (ACUTE KIDNEY INJURY): ICD-10-CM

## 2025-01-14 DIAGNOSIS — R00.0 TACHYCARDIA: ICD-10-CM

## 2025-01-14 PROBLEM — R65.20 SEVERE SEPSIS: Status: ACTIVE | Noted: 2025-01-14

## 2025-01-14 PROBLEM — N30.00 ACUTE CYSTITIS: Status: ACTIVE | Noted: 2025-01-14

## 2025-01-14 PROBLEM — A41.9 SEVERE SEPSIS: Status: ACTIVE | Noted: 2025-01-14

## 2025-01-14 LAB
ALBUMIN SERPL BCP-MCNC: 5.1 G/DL (ref 3.5–5.2)
ALLENS TEST: NORMAL
ALP SERPL-CCNC: 169 U/L (ref 40–150)
ALT SERPL W/O P-5'-P-CCNC: 55 U/L (ref 10–44)
ANION GAP SERPL CALC-SCNC: 19 MMOL/L (ref 8–16)
AST SERPL-CCNC: 31 U/L (ref 10–40)
BACTERIA #/AREA URNS AUTO: ABNORMAL /HPF
BASOPHILS # BLD AUTO: 0.05 K/UL (ref 0–0.2)
BASOPHILS NFR BLD: 0.2 % (ref 0–1.9)
BILIRUB SERPL-MCNC: 1.3 MG/DL (ref 0.1–1)
BILIRUB UR QL STRIP: NEGATIVE
BUN SERPL-MCNC: 32 MG/DL (ref 6–20)
CALCIUM SERPL-MCNC: 11.4 MG/DL (ref 8.7–10.5)
CHLORIDE SERPL-SCNC: 97 MMOL/L (ref 95–110)
CLARITY UR REFRACT.AUTO: ABNORMAL
CO2 SERPL-SCNC: 23 MMOL/L (ref 23–29)
COLOR UR AUTO: YELLOW
CREAT SERPL-MCNC: 1.3 MG/DL (ref 0.5–1.4)
DIFFERENTIAL METHOD BLD: ABNORMAL
EOSINOPHIL # BLD AUTO: 0 K/UL (ref 0–0.5)
EOSINOPHIL NFR BLD: 0 % (ref 0–8)
ERYTHROCYTE [DISTWIDTH] IN BLOOD BY AUTOMATED COUNT: 12.2 % (ref 11.5–14.5)
EST. GFR  (NO RACE VARIABLE): >60 ML/MIN/1.73 M^2
GLUCOSE SERPL-MCNC: 127 MG/DL (ref 70–110)
GLUCOSE UR QL STRIP: ABNORMAL
HCT VFR BLD AUTO: 54.1 % (ref 40–54)
HCV AB SERPL QL IA: NORMAL
HGB BLD-MCNC: 18.9 G/DL (ref 14–18)
HGB UR QL STRIP: ABNORMAL
HIV 1+2 AB+HIV1 P24 AG SERPL QL IA: NORMAL
HYALINE CASTS UR QL AUTO: 33 /LPF
IMM GRANULOCYTES # BLD AUTO: 0.17 K/UL (ref 0–0.04)
IMM GRANULOCYTES NFR BLD AUTO: 0.6 % (ref 0–0.5)
INFLUENZA A, MOLECULAR: NEGATIVE
INFLUENZA B, MOLECULAR: NEGATIVE
KETONES UR QL STRIP: ABNORMAL
LDH SERPL L TO P-CCNC: 1.33 MMOL/L (ref 0.5–2.2)
LEUKOCYTE ESTERASE UR QL STRIP: ABNORMAL
LIPASE SERPL-CCNC: 27 U/L (ref 4–60)
LYMPHOCYTES # BLD AUTO: 2.3 K/UL (ref 1–4.8)
LYMPHOCYTES NFR BLD: 7.7 % (ref 18–48)
MAGNESIUM SERPL-MCNC: 2.2 MG/DL (ref 1.6–2.6)
MCH RBC QN AUTO: 29.9 PG (ref 27–31)
MCHC RBC AUTO-ENTMCNC: 34.9 G/DL (ref 32–36)
MCV RBC AUTO: 86 FL (ref 82–98)
MICROSCOPIC COMMENT: ABNORMAL
MONOCYTES # BLD AUTO: 2.5 K/UL (ref 0.3–1)
MONOCYTES NFR BLD: 8.3 % (ref 4–15)
NEUTROPHILS # BLD AUTO: 24.9 K/UL (ref 1.8–7.7)
NEUTROPHILS NFR BLD: 83.2 % (ref 38–73)
NITRITE UR QL STRIP: NEGATIVE
NRBC BLD-RTO: 0 /100 WBC
OHS QRS DURATION: 84 MS
OHS QTC CALCULATION: 444 MS
PH UR STRIP: 6 [PH] (ref 5–8)
PLATELET # BLD AUTO: 299 K/UL (ref 150–450)
PMV BLD AUTO: 11.3 FL (ref 9.2–12.9)
POCT GLUCOSE: 151 MG/DL (ref 70–110)
POTASSIUM SERPL-SCNC: 3.7 MMOL/L (ref 3.5–5.1)
PROT SERPL-MCNC: 9.1 G/DL (ref 6–8.4)
PROT UR QL STRIP: ABNORMAL
RBC # BLD AUTO: 6.32 M/UL (ref 4.6–6.2)
RBC #/AREA URNS AUTO: 11 /HPF (ref 0–4)
SAMPLE: NORMAL
SARS-COV-2 RDRP RESP QL NAA+PROBE: NEGATIVE
SITE: NORMAL
SODIUM SERPL-SCNC: 139 MMOL/L (ref 136–145)
SP GR UR STRIP: >1.03 (ref 1–1.03)
SPECIMEN SOURCE: NORMAL
SQUAMOUS #/AREA URNS AUTO: 3 /HPF
URN SPEC COLLECT METH UR: ABNORMAL
WBC # BLD AUTO: 29.94 K/UL (ref 3.9–12.7)
WBC #/AREA URNS AUTO: >100 /HPF (ref 0–5)
WBC NRBC COR # BLD: ABNORMAL K/UL

## 2025-01-14 PROCEDURE — G0378 HOSPITAL OBSERVATION PER HR: HCPCS

## 2025-01-14 PROCEDURE — 96361 HYDRATE IV INFUSION ADD-ON: CPT

## 2025-01-14 PROCEDURE — 83690 ASSAY OF LIPASE: CPT

## 2025-01-14 PROCEDURE — 87635 SARS-COV-2 COVID-19 AMP PRB: CPT

## 2025-01-14 PROCEDURE — 85025 COMPLETE CBC W/AUTO DIFF WBC: CPT | Performed by: EMERGENCY MEDICINE

## 2025-01-14 PROCEDURE — 25000003 PHARM REV CODE 250

## 2025-01-14 PROCEDURE — 93005 ELECTROCARDIOGRAM TRACING: CPT

## 2025-01-14 PROCEDURE — 99285 EMERGENCY DEPT VISIT HI MDM: CPT | Mod: 25

## 2025-01-14 PROCEDURE — 87040 BLOOD CULTURE FOR BACTERIA: CPT | Mod: 59

## 2025-01-14 PROCEDURE — 87088 URINE BACTERIA CULTURE: CPT

## 2025-01-14 PROCEDURE — 87086 URINE CULTURE/COLONY COUNT: CPT

## 2025-01-14 PROCEDURE — 80053 COMPREHEN METABOLIC PANEL: CPT

## 2025-01-14 PROCEDURE — 81001 URINALYSIS AUTO W/SCOPE: CPT

## 2025-01-14 PROCEDURE — 63600175 PHARM REV CODE 636 W HCPCS

## 2025-01-14 PROCEDURE — 83735 ASSAY OF MAGNESIUM: CPT

## 2025-01-14 PROCEDURE — 96372 THER/PROPH/DIAG INJ SC/IM: CPT

## 2025-01-14 PROCEDURE — 87502 INFLUENZA DNA AMP PROBE: CPT

## 2025-01-14 PROCEDURE — 93010 ELECTROCARDIOGRAM REPORT: CPT | Mod: ,,, | Performed by: INTERNAL MEDICINE

## 2025-01-14 PROCEDURE — 87186 SC STD MICRODIL/AGAR DIL: CPT

## 2025-01-14 PROCEDURE — 99900035 HC TECH TIME PER 15 MIN (STAT)

## 2025-01-14 PROCEDURE — 87389 HIV-1 AG W/HIV-1&-2 AB AG IA: CPT | Performed by: PHYSICIAN ASSISTANT

## 2025-01-14 PROCEDURE — 82962 GLUCOSE BLOOD TEST: CPT

## 2025-01-14 PROCEDURE — 96374 THER/PROPH/DIAG INJ IV PUSH: CPT

## 2025-01-14 PROCEDURE — 86803 HEPATITIS C AB TEST: CPT | Performed by: PHYSICIAN ASSISTANT

## 2025-01-14 PROCEDURE — 83605 ASSAY OF LACTIC ACID: CPT

## 2025-01-14 RX ORDER — SODIUM CHLORIDE 0.9 % (FLUSH) 0.9 %
10 SYRINGE (ML) INJECTION EVERY 12 HOURS PRN
Status: DISCONTINUED | OUTPATIENT
Start: 2025-01-14 | End: 2025-01-15 | Stop reason: HOSPADM

## 2025-01-14 RX ORDER — CEFTRIAXONE 1 G/1
1 INJECTION, POWDER, FOR SOLUTION INTRAMUSCULAR; INTRAVENOUS
Status: COMPLETED | OUTPATIENT
Start: 2025-01-14 | End: 2025-01-14

## 2025-01-14 RX ORDER — METOPROLOL TARTRATE 25 MG/1
25 TABLET, FILM COATED ORAL 2 TIMES DAILY
Status: DISCONTINUED | OUTPATIENT
Start: 2025-01-14 | End: 2025-01-15 | Stop reason: HOSPADM

## 2025-01-14 RX ORDER — ACETAMINOPHEN 500 MG
1000 TABLET ORAL EVERY 8 HOURS PRN
Status: DISCONTINUED | OUTPATIENT
Start: 2025-01-14 | End: 2025-01-15 | Stop reason: HOSPADM

## 2025-01-14 RX ORDER — ATORVASTATIN CALCIUM 20 MG/1
40 TABLET, FILM COATED ORAL NIGHTLY
Status: DISCONTINUED | OUTPATIENT
Start: 2025-01-14 | End: 2025-01-15 | Stop reason: HOSPADM

## 2025-01-14 RX ORDER — IBUPROFEN 200 MG
24 TABLET ORAL
Status: DISCONTINUED | OUTPATIENT
Start: 2025-01-14 | End: 2025-01-15 | Stop reason: HOSPADM

## 2025-01-14 RX ORDER — SODIUM CHLORIDE, SODIUM LACTATE, POTASSIUM CHLORIDE, CALCIUM CHLORIDE 600; 310; 30; 20 MG/100ML; MG/100ML; MG/100ML; MG/100ML
INJECTION, SOLUTION INTRAVENOUS CONTINUOUS
Status: ACTIVE | OUTPATIENT
Start: 2025-01-14 | End: 2025-01-15

## 2025-01-14 RX ORDER — CEFTRIAXONE 1 G/1
1 INJECTION, POWDER, FOR SOLUTION INTRAMUSCULAR; INTRAVENOUS
Status: DISCONTINUED | OUTPATIENT
Start: 2025-01-15 | End: 2025-01-14

## 2025-01-14 RX ORDER — ESCITALOPRAM OXALATE 10 MG/1
10 TABLET ORAL DAILY
Status: DISCONTINUED | OUTPATIENT
Start: 2025-01-15 | End: 2025-01-15 | Stop reason: HOSPADM

## 2025-01-14 RX ORDER — NALOXONE HCL 0.4 MG/ML
0.02 VIAL (ML) INJECTION
Status: DISCONTINUED | OUTPATIENT
Start: 2025-01-14 | End: 2025-01-15 | Stop reason: HOSPADM

## 2025-01-14 RX ORDER — ONDANSETRON HYDROCHLORIDE 2 MG/ML
4 INJECTION, SOLUTION INTRAVENOUS
Status: DISCONTINUED | OUTPATIENT
Start: 2025-01-14 | End: 2025-01-14

## 2025-01-14 RX ORDER — CEFTRIAXONE 2 G/1
2 INJECTION, POWDER, FOR SOLUTION INTRAMUSCULAR; INTRAVENOUS
Status: DISCONTINUED | OUTPATIENT
Start: 2025-01-15 | End: 2025-01-15

## 2025-01-14 RX ORDER — HYDRALAZINE HYDROCHLORIDE 20 MG/ML
5 INJECTION INTRAMUSCULAR; INTRAVENOUS EVERY 4 HOURS PRN
Status: DISCONTINUED | OUTPATIENT
Start: 2025-01-14 | End: 2025-01-15 | Stop reason: HOSPADM

## 2025-01-14 RX ORDER — GLUCAGON 1 MG
1 KIT INJECTION
Status: DISCONTINUED | OUTPATIENT
Start: 2025-01-14 | End: 2025-01-15 | Stop reason: HOSPADM

## 2025-01-14 RX ORDER — ONDANSETRON HYDROCHLORIDE 2 MG/ML
4 INJECTION, SOLUTION INTRAVENOUS
Status: COMPLETED | OUTPATIENT
Start: 2025-01-14 | End: 2025-01-14

## 2025-01-14 RX ORDER — IBUPROFEN 200 MG
16 TABLET ORAL
Status: DISCONTINUED | OUTPATIENT
Start: 2025-01-14 | End: 2025-01-15 | Stop reason: HOSPADM

## 2025-01-14 RX ORDER — METHOCARBAMOL 750 MG/1
750 TABLET, FILM COATED ORAL 3 TIMES DAILY PRN
Status: DISCONTINUED | OUTPATIENT
Start: 2025-01-14 | End: 2025-01-15 | Stop reason: HOSPADM

## 2025-01-14 RX ORDER — ENOXAPARIN SODIUM 100 MG/ML
40 INJECTION SUBCUTANEOUS EVERY 24 HOURS
Status: DISCONTINUED | OUTPATIENT
Start: 2025-01-14 | End: 2025-01-15 | Stop reason: HOSPADM

## 2025-01-14 RX ORDER — HYDROCHLOROTHIAZIDE 12.5 MG/1
12.5 TABLET ORAL DAILY
Status: DISCONTINUED | OUTPATIENT
Start: 2025-01-15 | End: 2025-01-14

## 2025-01-14 RX ORDER — PROCHLORPERAZINE EDISYLATE 5 MG/ML
10 INJECTION INTRAMUSCULAR; INTRAVENOUS
Status: COMPLETED | OUTPATIENT
Start: 2025-01-14 | End: 2025-01-14

## 2025-01-14 RX ORDER — ONDANSETRON HYDROCHLORIDE 2 MG/ML
8 INJECTION, SOLUTION INTRAVENOUS
Status: DISCONTINUED | OUTPATIENT
Start: 2025-01-14 | End: 2025-01-14

## 2025-01-14 RX ORDER — AMLODIPINE BESYLATE 10 MG/1
10 TABLET ORAL DAILY
Status: DISCONTINUED | OUTPATIENT
Start: 2025-01-15 | End: 2025-01-15 | Stop reason: HOSPADM

## 2025-01-14 RX ADMIN — ONDANSETRON 4 MG: 2 INJECTION INTRAMUSCULAR; INTRAVENOUS at 02:01

## 2025-01-14 RX ADMIN — CEFTRIAXONE 1 G: 1 INJECTION, POWDER, FOR SOLUTION INTRAMUSCULAR; INTRAVENOUS at 03:01

## 2025-01-14 RX ADMIN — PROCHLORPERAZINE EDISYLATE 10 MG: 5 INJECTION INTRAMUSCULAR; INTRAVENOUS at 03:01

## 2025-01-14 RX ADMIN — SODIUM CHLORIDE, POTASSIUM CHLORIDE, SODIUM LACTATE AND CALCIUM CHLORIDE: 600; 310; 30; 20 INJECTION, SOLUTION INTRAVENOUS at 06:01

## 2025-01-14 RX ADMIN — ENOXAPARIN SODIUM 40 MG: 40 INJECTION SUBCUTANEOUS at 06:01

## 2025-01-14 RX ADMIN — SODIUM CHLORIDE 1000 ML: 9 INJECTION, SOLUTION INTRAVENOUS at 02:01

## 2025-01-14 RX ADMIN — METOPROLOL TARTRATE 25 MG: 25 TABLET, FILM COATED ORAL at 09:01

## 2025-01-14 RX ADMIN — ATORVASTATIN CALCIUM 40 MG: 20 TABLET, FILM COATED ORAL at 09:01

## 2025-01-14 NOTE — SUBJECTIVE & OBJECTIVE
Past Medical History:   Diagnosis Date    Hyperlipidemia     Hypertension     Prediabetes        History reviewed. No pertinent surgical history.    Review of patient's allergies indicates:  No Known Allergies    No current facility-administered medications on file prior to encounter.     Current Outpatient Medications on File Prior to Encounter   Medication Sig    amlodipine (NORVASC) 10 MG tablet Take 10 mg by mouth once daily.    atorvastatin (LIPITOR) 40 MG tablet TAKE BY MOUTH 1 TABLET AT BEDTIME    hydroCHLOROthiazide (MICROZIDE) 12.5 mg capsule Take 12.5 mg by mouth once daily.    nebivolol (BYSTOLIC) 5 MG Tab Take 10 mg by mouth once daily.    (Magic mouthwash) 1:1:1 Benadryl 12.5mg/5ml liq, aluminum & magnesium hydroxide-simehticone (Maalox), lidocaine viscous 2% Swish and spit 10 mLs every 4 (four) hours as needed. for mouth sores    capsaicin-menthol (SALONPAS, CAPSAICIN-MENTHOL,) 0.025-1.25 % PtMd Apply 1 packet topically every 12 (twelve) hours.    cetirizine (ZYRTEC) 10 MG tablet Take 1 tablet (10 mg total) by mouth once daily.    clonazePAM (KLONOPIN) 1 MG tablet Take 1 mg by mouth 2 (two) times daily as needed. (Patient not taking: Reported on 11/3/2024)    EScitalopram oxalate (LEXAPRO) 10 MG tablet Take 10 mg by mouth once daily.    fluticasone propionate (FLONASE ALLERGY RELIEF) 50 mcg/actuation nasal spray 1 spray (50 mcg total) by Each Nostril route 2 (two) times daily. (Patient not taking: Reported on 11/3/2024)    GI cocktail antac/dicyc/lidoc Take 50 mLs by mouth 3 (three) times daily as needed.    metFORMIN (GLUCOPHAGE) 1000 MG tablet Hold until nausea and diarrhea resolved    methocarbamol (ROBAXIN) 750 MG Tab TAKE BY MOUTH 1 TABLET 3 TIMES A DAY AS NEEDED    ondansetron (ZOFRAN) 8 MG tablet Take 1 tablet (8 mg total) by mouth every 8 (eight) hours as needed for Nausea. (Patient not taking: Reported on 11/3/2024)    ondansetron (ZOFRAN-ODT) 4 MG TbDL Take 1 tablet (4 mg total) by mouth  every 8 (eight) hours as needed.    promethazine-dextromethorphan (PROMETHAZINE-DM) 6.25-15 mg/5 mL Syrp Take 5 mLs by mouth every 6 (six) hours as needed (for cough). Will cause drowsiness. (Patient not taking: Reported on 11/3/2024)     Family History    None       Tobacco Use    Smoking status: Former     Types: Vaping with nicotine     Quit date: 2020     Years since quittin.4     Passive exposure: Never    Smokeless tobacco: Never   Substance and Sexual Activity    Alcohol use: Yes     Comment: 1xweekly    Drug use: Not Currently     Types: Marijuana    Sexual activity: Not on file     Review of Systems   Constitutional:  Negative for diaphoresis, fatigue and fever.   HENT:  Negative for dental problem, drooling, ear discharge, mouth sores and sore throat.    Eyes:  Negative for photophobia, pain and redness.   Respiratory:  Negative for cough, chest tightness and shortness of breath.    Cardiovascular:  Negative for chest pain and palpitations.   Gastrointestinal:  Positive for diarrhea, nausea and vomiting. Negative for abdominal pain, blood in stool and constipation.   Genitourinary:  Negative for decreased urine volume, difficulty urinating, dysuria, hematuria and urgency.   Musculoskeletal:  Negative for arthralgias, gait problem and neck pain.   Skin:  Negative for color change and rash.   Neurological:  Negative for dizziness, syncope, facial asymmetry, numbness and headaches.   Psychiatric/Behavioral:  Negative for confusion.      Objective:     Vital Signs (Most Recent):  Temp: 99.7 °F (37.6 °C) (25 1508)  Pulse: 110 (25 1508)  Resp: 16 (25 1508)  BP: (!) 160/91 (25 1508)  SpO2: 99 % (25 1508) Vital Signs (24h Range):  Temp:  [98.1 °F (36.7 °C)-99.7 °F (37.6 °C)] 99.7 °F (37.6 °C)  Pulse:  [110-138] 110  Resp:  [16] 16  SpO2:  [97 %-99 %] 99 %  BP: (160-171)/(85-91) 160/91     Weight: 108.9 kg (240 lb)  Body mass index is 38.74 kg/m².     Physical  Exam  Constitutional:       Appearance: Normal appearance. He is not ill-appearing.   HENT:      Head: Normocephalic and atraumatic.      Right Ear: External ear normal.      Left Ear: External ear normal.      Nose: Nose normal. No congestion or rhinorrhea.      Mouth/Throat:      Mouth: Mucous membranes are moist.      Pharynx: Oropharynx is clear.   Eyes:      Extraocular Movements: Extraocular movements intact.      Conjunctiva/sclera: Conjunctivae normal.      Pupils: Pupils are equal, round, and reactive to light.   Cardiovascular:      Rate and Rhythm: Normal rate and regular rhythm.      Heart sounds: No murmur heard.  Pulmonary:      Effort: Pulmonary effort is normal. No respiratory distress.      Breath sounds: Normal breath sounds. No wheezing.   Abdominal:      General: Abdomen is flat. Bowel sounds are normal. There is no distension.      Palpations: Abdomen is soft.      Tenderness: There is no abdominal tenderness.   Musculoskeletal:         General: No swelling. Normal range of motion.      Cervical back: No rigidity or tenderness.   Skin:     General: Skin is warm and dry.      Coloration: Skin is not jaundiced.   Neurological:      General: No focal deficit present.      Mental Status: He is alert and oriented to person, place, and time.              CRANIAL NERVES     CN III, IV, VI   Pupils are equal, round, and reactive to light.       Significant Labs: All pertinent labs within the past 24 hours have been reviewed.    Significant Imaging: I have reviewed all pertinent imaging results/findings within the past 24 hours.

## 2025-01-14 NOTE — CARE UPDATE
[facility] Sepsis Follow Up Note     Patient's sepsis care was reviewed, and yes a perfusion assessment was performed within 6 hours of sepsis presentation after bolus which shows adequate tissue perfusion assessed by non-invasive monitoring on 01/14/2025.     Patient has received appropriate sepsis care and received 1L IVF bolus. Tachycardia improved after fluid challenge. He was not hypotensive, so did not require full sepsis bolus.     Darlene Ndiaye, RENALDO  [facility]

## 2025-01-14 NOTE — ED PROVIDER NOTES
Encounter Date: 2025       History     Chief Complaint   Patient presents with    Vomiting     Pt c/o vomiting since last night.  Pt reports approx 30 episodes of vomiting.  Pt had a telemedicine appt for nausea and diarrhea on -given Zofran.      49-year-old male with a history of type II diabetes presents to the emergency department due to persistent nausea and vomiting that began  evening, preceded by diarrhea. The patient reports that on Saturday, he started experiencing nausea and diarrhea and was prescribed Zofran by a Telehealth provider. However, after eating dinner on  night, he began experiencing episodes of non-bloody emesis every hour. He now reports developing umbilical discomfort following excessive vomiting. The patient has a known sick contact with Influenza A. He denies fever or dysuria.      Review of patient's allergies indicates:  No Known Allergies  Past Medical History:   Diagnosis Date    Hyperlipidemia     Hypertension     Prediabetes      History reviewed. No pertinent surgical history.  No family history on file.  Social History     Tobacco Use    Smoking status: Former     Types: Vaping with nicotine     Quit date: 2020     Years since quittin.4     Passive exposure: Never    Smokeless tobacco: Never   Substance Use Topics    Alcohol use: Yes     Comment: 1xweekly    Drug use: Not Currently     Types: Marijuana     Review of Systems  See HPI  Physical Exam     Initial Vitals [25 1232]   BP Pulse Resp Temp SpO2   (!) 171/85 (!) 131 16 98.1 °F (36.7 °C) 97 %      MAP       --         Physical Exam    Vitals reviewed.  Constitutional: He appears well-developed.   HENT:   Head: Normocephalic and atraumatic.   Eyes: Conjunctivae and EOM are normal.   Neck:   Normal range of motion.  Cardiovascular:  Normal rate.           Pulmonary/Chest: No respiratory distress.   Abdominal: Abdomen is soft. He exhibits no distension. There is no abdominal tenderness  (suprapubic). There is no rebound.   Musculoskeletal:         General: Normal range of motion.      Cervical back: Normal range of motion.     Neurological: He is alert and oriented to person, place, and time.   Skin: Skin is warm and dry.   Psychiatric: He has a normal mood and affect. Thought content normal.         ED Course   Procedures  Labs Reviewed   URINALYSIS, REFLEX TO URINE CULTURE - Abnormal       Result Value    Specimen UA Urine, Clean Catch      Color, UA Yellow      Appearance, UA Hazy (*)     pH, UA 6.0      Specific Gravity, UA >1.030 (*)     Protein, UA 2+ (*)     Glucose, UA Trace (*)     Ketones, UA 1+ (*)     Bilirubin (UA) Negative      Occult Blood UA 1+ (*)     Nitrite, UA Negative      Leukocytes, UA 3+ (*)     Narrative:     Specimen Source->Urine   COMPREHENSIVE METABOLIC PANEL - Abnormal    Sodium 139      Potassium 3.7      Chloride 97      CO2 23      Glucose 127 (*)     BUN 32 (*)     Creatinine 1.3      Calcium 11.4 (*)     Total Protein 9.1 (*)     Albumin 5.1      Total Bilirubin 1.3 (*)     Alkaline Phosphatase 169 (*)     AST 31      ALT 55 (*)     eGFR >60.0      Anion Gap 19 (*)    URINALYSIS MICROSCOPIC - Abnormal    RBC, UA 11 (*)     WBC, UA >100 (*)     Bacteria Moderate (*)     Squam Epithel, UA 3      Hyaline Casts, UA 33 (*)     Microscopic Comment SEE COMMENT      Narrative:     Specimen Source->Urine   CBC W/ AUTO DIFFERENTIAL - Abnormal    WBC 29.94 (*)     WBC-Corrected for NRBC's CANCELED      RBC 6.32 (*)     Hemoglobin 18.9 (*)     Hematocrit 54.1 (*)     MCV 86      MCH 29.9      MCHC 34.9      RDW 12.2      Platelets 299      MPV 11.3      Immature Granulocytes 0.6 (*)     Gran # (ANC) 24.9 (*)     Immature Grans (Abs) 0.17 (*)     Lymph # 2.3      Mono # 2.5 (*)     Eos # 0.0      Baso # 0.05      nRBC 0      Gran % 83.2 (*)     Lymph % 7.7 (*)     Mono % 8.3      Eosinophil % 0.0      Basophil % 0.2      Differential Method Automated     POCT GLUCOSE -  Abnormal    POCT Glucose 151 (*)    INFLUENZA A & B BY MOLECULAR    Influenza A, Molecular Negative      Influenza B, Molecular Negative      Flu A & B Source NP     CULTURE, URINE   CULTURE, BLOOD   CULTURE, BLOOD   HEPATITIS C ANTIBODY    Hepatitis C Ab Non-reactive      Narrative:     Release to patient->Immediate   HIV 1 / 2 ANTIBODY    HIV 1/2 Ag/Ab Non-reactive      Narrative:     Release to patient->Immediate   LIPASE    Lipase 27     SARS-COV-2 RNA AMPLIFICATION, QUAL    SARS-CoV-2 RNA, Amplification, Qual Negative     ISTAT LACTATE    POC Lactate 1.33      Sample VENOUS      Site Other      Allens Test N/A          ECG Results              EKG 12-lead (Final result)        Collection Time Result Time QRS Duration OHS QTC Calculation    01/14/25 13:39:49 01/14/25 16:09:43 84 444                     Final result by Interface, Lab In [facility] (01/14/25 16:09:45)                   Narrative:    Test Reason : R00.0,    Vent. Rate : 132 BPM     Atrial Rate : 132 BPM     P-R Int : 128 ms          QRS Dur :  84 ms      QT Int : 300 ms       P-R-T Axes :  73  88  24 degrees    QTcB Int : 444 ms    Sinus tachycardia  Right atrial enlargement  Nonspecific ST abnormality  Abnormal ECG  When compared with ECG of 06-May-2022 14:18,  Vent. rate has increased by  52 bpm  ST depression has replaced ST elevation in Inferior leads  ST now depressed in Lateral leads  Confirmed by Gary Ricketts ([id number]) on 1/14/2025 4:09:39 PM    Referred By: AAAREFERRAL SELF           Confirmed By: Gary Ricketts                                  Imaging Results    None          Medications   methocarbamoL tablet 750 mg (has no administration in time range)   amLODIPine tablet 10 mg (has no administration in time range)   atorvastatin tablet 40 mg (has no administration in time range)   EScitalopram oxalate tablet 10 mg (has no administration in time range)   metoprolol tartrate (LOPRESSOR) tablet 25 mg (has no administration in time range)   sodium  chloride 0.9% flush 10 mL (has no administration in time range)   naloxone 0.4 mg/mL injection 0.02 mg (has no administration in time range)   glucose chewable tablet 16 g (has no administration in time range)   glucose chewable tablet 24 g (has no administration in time range)   dextrose 50% injection 12.5 g (has no administration in time range)   dextrose 50% injection 25 g (has no administration in time range)   glucagon (human recombinant) injection 1 mg (has no administration in time range)   enoxaparin injection 40 mg (has no administration in time range)   acetaminophen tablet 1,000 mg (has no administration in time range)   hydrALAZINE injection 5 mg (has no administration in time range)   cefTRIAXone injection 2 g (has no administration in time range)   sodium chloride 0.9% bolus 1,000 mL 1,000 mL (0 mLs Intravenous Stopped 1/14/25 1552)   ondansetron injection 4 mg (4 mg Intravenous Given 1/14/25 1459)   cefTRIAXone injection 1 g (1 g Intravenous Given 1/14/25 1500)   cefTRIAXone injection 1 g (1 g Intravenous Given 1/14/25 1526)   prochlorperazine injection Soln 10 mg (10 mg Intravenous Given 1/14/25 1546)     Medical Decision Making  The patient is a 49-year-old male pmg DMII  presenting to the emergency department with complaints of nausea and vomiting.  Differential diagnosis includes, but is not limited to, gastroenteritis, hyperglycemia, dehydration, influenza/COVID, gastroparesis, pancreatitis, and UTI.    The patient meets SIRS criteria also has  an elevated anion gap but a normal lactate. His CMP reveals dehydration, as suspected, with hypercalcemia and elevated BUN. UA shows significant WBC, and leukocytosis is notably elevated. The patient was given IV fluids and IV antibiotics but required multiple rounds of antiemetics.    The decision was made to admit the patient to hospital medicine for a complicated UTI with intractable nausea and vomiting.  The case was discussed with the supervising  physician.      Amount and/or Complexity of Data Reviewed  Labs: ordered.  ECG/medicine tests:      Details: Sinus tachycardia 132 bpm, no specific ST changes in lateral leads.     Risk  Prescription drug management.               ED Course as of 01/14/25 1646 Tue Jan 14, 2025   1445 Additional labs placed with infected UA  [CR]      ED Course User Index  [CR] Venita Nixon PA-C                           Clinical Impression:  Final diagnoses:  [R00.0] Tachycardia  [N39.0] Complicated UTI (urinary tract infection) (Primary)          ED Disposition Condition    Observation Stable                Venita Nixon PA-C  01/14/25 1634       Venita Nixon PA-C  01/14/25 1646

## 2025-01-14 NOTE — H&P
Dictation #1  MRN:[id number]  CSN:[id number]   [facility] - Emergency Dept  [facility]  History & Physical    Patient Name: Mateo George  MRN: [id number]  Patient Class: OP- Observation  Admission Date: 1/14/2025  Attending Physician: Thomas Winston DO   Primary Care Provider: Maira Santiago MD         Patient information was obtained from patient and ER records.     Subjective:     Principal Problem:Severe sepsis    Chief Complaint:   Chief Complaint   Patient presents with    Vomiting     Pt c/o vomiting since last night.  Pt reports approx 30 episodes of vomiting.  Pt had a telemedicine appt for nausea and diarrhea on Sunday-given Zofran.         HPI: 50yo male whose PMH includes HTN and t2dm presents c/o intractable N/V.  2 days ago he began having diarrhea and then developed N/V.  He notes subjective fever and chills.  No bloody stool or bloody emesis.  He has hx of cyclical vomiting.  He smokes marijuana but states that even when he does not smoke sometimes he has vomiting.  He presented today because he felt dehydrated.  He was tachycardic on arrival.  White count elevated at 29.  Cr 1.3, BL 0.7.  UA was obtained that showed concerns for infection.  He was given 2g rocephin and admitted to medicine for further management.    Past Medical History:   Diagnosis Date    Hyperlipidemia     Hypertension     Prediabetes        History reviewed. No pertinent surgical history.    Review of patient's allergies indicates:  No Known Allergies    No current facility-administered medications on file prior to encounter.     Current Outpatient Medications on File Prior to Encounter   Medication Sig    amlodipine (NORVASC) 10 MG tablet Take 10 mg by mouth once daily.    atorvastatin (LIPITOR) 40 MG tablet TAKE BY MOUTH 1 TABLET AT BEDTIME    hydroCHLOROthiazide (MICROZIDE) 12.5 mg capsule Take 12.5 mg by mouth once daily.    nebivolol (BYSTOLIC) 5 MG Tab Take 10 mg by mouth once daily.    (Magic mouthwash) 1:1:1  Benadryl 12.5mg/5ml liq, aluminum & magnesium hydroxide-simehticone (Maalox), lidocaine viscous 2% Swish and spit 10 mLs every 4 (four) hours as needed. for mouth sores    capsaicin-menthol (SALONPAS, CAPSAICIN-MENTHOL,) 0.025-1.25 % PtMd Apply 1 packet topically every 12 (twelve) hours.    cetirizine (ZYRTEC) 10 MG tablet Take 1 tablet (10 mg total) by mouth once daily.    clonazePAM (KLONOPIN) 1 MG tablet Take 1 mg by mouth 2 (two) times daily as needed. (Patient not taking: Reported on 11/3/2024)    EScitalopram oxalate (LEXAPRO) 10 MG tablet Take 10 mg by mouth once daily.    fluticasone propionate (FLONASE ALLERGY RELIEF) 50 mcg/actuation nasal spray 1 spray (50 mcg total) by Each Nostril route 2 (two) times daily. (Patient not taking: Reported on 11/3/2024)    GI cocktail antac/dicyc/lidoc Take 50 mLs by mouth 3 (three) times daily as needed.    metFORMIN (GLUCOPHAGE) 1000 MG tablet Hold until nausea and diarrhea resolved    methocarbamol (ROBAXIN) 750 MG Tab TAKE BY MOUTH 1 TABLET 3 TIMES A DAY AS NEEDED    ondansetron (ZOFRAN) 8 MG tablet Take 1 tablet (8 mg total) by mouth every 8 (eight) hours as needed for Nausea. (Patient not taking: Reported on 11/3/2024)    ondansetron (ZOFRAN-ODT) 4 MG TbDL Take 1 tablet (4 mg total) by mouth every 8 (eight) hours as needed.    promethazine-dextromethorphan (PROMETHAZINE-DM) 6.25-15 mg/5 mL Syrp Take 5 mLs by mouth every 6 (six) hours as needed (for cough). Will cause drowsiness. (Patient not taking: Reported on 11/3/2024)     Family History    None       Tobacco Use    Smoking status: Former     Types: Vaping with nicotine     Quit date: 2020     Years since quittin.4     Passive exposure: Never    Smokeless tobacco: Never   Substance and Sexual Activity    Alcohol use: Yes     Comment: 1xweekly    Drug use: Not Currently     Types: Marijuana    Sexual activity: Not on file     Review of Systems   Constitutional:  Negative for diaphoresis, fatigue and fever.    HENT:  Negative for dental problem, drooling, ear discharge, mouth sores and sore throat.    Eyes:  Negative for photophobia, pain and redness.   Respiratory:  Negative for cough, chest tightness and shortness of breath.    Cardiovascular:  Negative for chest pain and palpitations.   Gastrointestinal:  Positive for diarrhea, nausea and vomiting. Negative for abdominal pain, blood in stool and constipation.   Genitourinary:  Negative for decreased urine volume, difficulty urinating, dysuria, hematuria and urgency.   Musculoskeletal:  Negative for arthralgias, gait problem and neck pain.   Skin:  Negative for color change and rash.   Neurological:  Negative for dizziness, syncope, facial asymmetry, numbness and headaches.   Psychiatric/Behavioral:  Negative for confusion.      Objective:     Vital Signs (Most Recent):  Temp: 99.7 °F (37.6 °C) (01/14/25 1508)  Pulse: 110 (01/14/25 1508)  Resp: 16 (01/14/25 1508)  BP: (!) 160/91 (01/14/25 1508)  SpO2: 99 % (01/14/25 1508) Vital Signs (24h Range):  Temp:  [98.1 °F (36.7 °C)-99.7 °F (37.6 °C)] 99.7 °F (37.6 °C)  Pulse:  [110-138] 110  Resp:  [16] 16  SpO2:  [97 %-99 %] 99 %  BP: (160-171)/(85-91) 160/91     Weight: 108.9 kg (240 lb)  Body mass index is 38.74 kg/m².     Physical Exam  Constitutional:       Appearance: Normal appearance. He is not ill-appearing.   HENT:      Head: Normocephalic and atraumatic.      Right Ear: External ear normal.      Left Ear: External ear normal.      Nose: Nose normal. No congestion or rhinorrhea.      Mouth/Throat:      Mouth: Mucous membranes are moist.      Pharynx: Oropharynx is clear.   Eyes:      Extraocular Movements: Extraocular movements intact.      Conjunctiva/sclera: Conjunctivae normal.      Pupils: Pupils are equal, round, and reactive to light.   Cardiovascular:      Rate and Rhythm: Normal rate and regular rhythm.      Heart sounds: No murmur heard.  Pulmonary:      Effort: Pulmonary effort is normal. No respiratory  distress.      Breath sounds: Normal breath sounds. No wheezing.   Abdominal:      General: Abdomen is flat. Bowel sounds are normal. There is no distension.      Palpations: Abdomen is soft.      Tenderness: There is no abdominal tenderness.   Musculoskeletal:         General: No swelling. Normal range of motion.      Cervical back: No rigidity or tenderness.   Skin:     General: Skin is warm and dry.      Coloration: Skin is not jaundiced.   Neurological:      General: No focal deficit present.      Mental Status: He is alert and oriented to person, place, and time.              CRANIAL NERVES     CN III, IV, VI   Pupils are equal, round, and reactive to light.       Significant Labs: All pertinent labs within the past 24 hours have been reviewed.    Significant Imaging: I have reviewed all pertinent imaging results/findings within the past 24 hours.  Assessment/Plan:     * Severe sepsis  Secondary to UTI  White count 29, HR >100  End organ damage as evidenced by OBI, Cr 1.3 on admission, BL ~0.7    UA with evidence of infection    Rocephin imitated in the ED, cont rocephin 2g q24h  Blood cultures pending  Monitor cultures, deescalate as appropriate  Cont IVF  Monitor UOP  Monitor renal function  Obtain renal US    OBI (acute kidney injury)  See plan for sepsis    Acute cystitis  See plan for sepsis    Type 2 diabetes mellitus  Hold metformin  SSI  Diabetic diet when not NPO  ACHS glucose checks    Essential hypertension  Uncontrolled  Resume home regimen as indicated  Hold HCTZ    Intractable nausea and vomiting  Suspect secondary to UTI v cannabinoid hyperemesis syndrome  Treat UTI  Recommend marijuana cessation  Ok for CLD, advance as tolerated  PRN antiemetics  If symptoms don't resolve, consider reglan      VTE Risk Mitigation (From admission, onward)           Ordered     enoxaparin injection 40 mg  Daily         01/14/25 1623     IP VTE HIGH RISK PATIENT  Once         01/14/25 1623     Place sequential  compression device  Until discontinued         01/14/25 1623                       On 01/14/2025, patient should be placed in hospital observation services under my care.             Thomas Winston DO  Department of Hospital Medicine  [facility] - Emergency Dept

## 2025-01-14 NOTE — ASSESSMENT & PLAN NOTE
Secondary to UTI  White count 29, HR >100  End organ damage as evidenced by OBI, Cr 1.3 on admission, BL ~0.7    UA with evidence of infection    Rocephin imitated in the ED, cont rocephin 2g q24h  Blood cultures pending  Monitor cultures, deescalate as appropriate  Cont IVF  Monitor UOP  Monitor renal function  Obtain renal US

## 2025-01-14 NOTE — ED NOTES
Assumed care of the patient. Report received from ALBERTO Kaminski. Pt in own clothing, side rails up X2, bed low and locked. No family/visitors at bedside at this time. Pt denies any complaints or needs.   Mateo George, a 49 y.o. male presents to the ED w/ complaint of nausea/vomiting     Triage note:  Chief Complaint   Patient presents with    Vomiting     Pt c/o vomiting since last night.  Pt reports approx 30 episodes of vomiting.  Pt had a telemedicine appt for nausea and diarrhea on Sunday-given Zofran.      Review of patient's allergies indicates:  No Known Allergies  Past Medical History:   Diagnosis Date    Hyperlipidemia     Hypertension     Prediabetes

## 2025-01-14 NOTE — ED TRIAGE NOTES
Had tele med visit on Sunday for N/V. Now having  multiple episode of diarrhea and told to come to ED for dehydration and abdominal pain. Also reports LLQ abd pain. Family at home +Flu A. Reports some flu symptoms. C/O body aches, chills, and sore throat. Denies SOB or Chest pain.

## 2025-01-15 VITALS
TEMPERATURE: 99 F | SYSTOLIC BLOOD PRESSURE: 118 MMHG | HEART RATE: 87 BPM | WEIGHT: 219.56 LBS | RESPIRATION RATE: 18 BRPM | OXYGEN SATURATION: 95 % | DIASTOLIC BLOOD PRESSURE: 55 MMHG | BODY MASS INDEX: 35.29 KG/M2 | HEIGHT: 66 IN

## 2025-01-15 LAB
ALBUMIN SERPL BCP-MCNC: 3.9 G/DL (ref 3.5–5.2)
ANION GAP SERPL CALC-SCNC: 11 MMOL/L (ref 8–16)
BUN SERPL-MCNC: 20 MG/DL (ref 6–20)
CALCIUM SERPL-MCNC: 9.5 MG/DL (ref 8.7–10.5)
CHLORIDE SERPL-SCNC: 100 MMOL/L (ref 95–110)
CO2 SERPL-SCNC: 28 MMOL/L (ref 23–29)
CREAT SERPL-MCNC: 0.8 MG/DL (ref 0.5–1.4)
ERYTHROCYTE [DISTWIDTH] IN BLOOD BY AUTOMATED COUNT: 12.1 % (ref 11.5–14.5)
EST. GFR  (NO RACE VARIABLE): >60 ML/MIN/1.73 M^2
ESTIMATED AVG GLUCOSE: 123 MG/DL (ref 68–131)
GLUCOSE SERPL-MCNC: 107 MG/DL (ref 70–110)
HBA1C MFR BLD: 5.9 % (ref 4–5.6)
HCT VFR BLD AUTO: 46.4 % (ref 40–54)
HGB BLD-MCNC: 16 G/DL (ref 14–18)
MAGNESIUM SERPL-MCNC: 2 MG/DL (ref 1.6–2.6)
MCH RBC QN AUTO: 30.1 PG (ref 27–31)
MCHC RBC AUTO-ENTMCNC: 34.5 G/DL (ref 32–36)
MCV RBC AUTO: 87 FL (ref 82–98)
PHOSPHATE SERPL-MCNC: 3.1 MG/DL (ref 2.7–4.5)
PLATELET # BLD AUTO: 221 K/UL (ref 150–450)
PMV BLD AUTO: 11.4 FL (ref 9.2–12.9)
POTASSIUM SERPL-SCNC: 3.3 MMOL/L (ref 3.5–5.1)
RBC # BLD AUTO: 5.32 M/UL (ref 4.6–6.2)
SODIUM SERPL-SCNC: 139 MMOL/L (ref 136–145)
WBC # BLD AUTO: 20.68 K/UL (ref 3.9–12.7)

## 2025-01-15 PROCEDURE — 96361 HYDRATE IV INFUSION ADD-ON: CPT

## 2025-01-15 PROCEDURE — 83036 HEMOGLOBIN GLYCOSYLATED A1C: CPT | Performed by: STUDENT IN AN ORGANIZED HEALTH CARE EDUCATION/TRAINING PROGRAM

## 2025-01-15 PROCEDURE — 80069 RENAL FUNCTION PANEL: CPT

## 2025-01-15 PROCEDURE — 83735 ASSAY OF MAGNESIUM: CPT

## 2025-01-15 PROCEDURE — 96376 TX/PRO/DX INJ SAME DRUG ADON: CPT

## 2025-01-15 PROCEDURE — 25000003 PHARM REV CODE 250

## 2025-01-15 PROCEDURE — 85027 COMPLETE CBC AUTOMATED: CPT

## 2025-01-15 PROCEDURE — 36415 COLL VENOUS BLD VENIPUNCTURE: CPT

## 2025-01-15 PROCEDURE — 36415 COLL VENOUS BLD VENIPUNCTURE: CPT | Performed by: STUDENT IN AN ORGANIZED HEALTH CARE EDUCATION/TRAINING PROGRAM

## 2025-01-15 PROCEDURE — 63600175 PHARM REV CODE 636 W HCPCS

## 2025-01-15 PROCEDURE — G0378 HOSPITAL OBSERVATION PER HR: HCPCS

## 2025-01-15 RX ORDER — CEFPODOXIME PROXETIL 100 MG/1
100 TABLET, FILM COATED ORAL EVERY 12 HOURS
Qty: 24 TABLET | Refills: 0 | Status: SHIPPED | OUTPATIENT
Start: 2025-01-16 | End: 2025-01-28

## 2025-01-15 RX ORDER — IBUPROFEN 200 MG
24 TABLET ORAL
Status: DISCONTINUED | OUTPATIENT
Start: 2025-01-15 | End: 2025-01-15

## 2025-01-15 RX ORDER — GLUCAGON 1 MG
1 KIT INJECTION
Status: DISCONTINUED | OUTPATIENT
Start: 2025-01-15 | End: 2025-01-15

## 2025-01-15 RX ORDER — IBUPROFEN 200 MG
16 TABLET ORAL
Status: DISCONTINUED | OUTPATIENT
Start: 2025-01-15 | End: 2025-01-15

## 2025-01-15 RX ORDER — CEFTRIAXONE 2 G/1
2 INJECTION, POWDER, FOR SOLUTION INTRAMUSCULAR; INTRAVENOUS
Status: DISCONTINUED | OUTPATIENT
Start: 2025-01-15 | End: 2025-01-15 | Stop reason: HOSPADM

## 2025-01-15 RX ORDER — INSULIN ASPART 100 [IU]/ML
0-5 INJECTION, SOLUTION INTRAVENOUS; SUBCUTANEOUS
Status: DISCONTINUED | OUTPATIENT
Start: 2025-01-15 | End: 2025-01-15

## 2025-01-15 RX ADMIN — CEFTRIAXONE 2 G: 2 INJECTION, POWDER, FOR SOLUTION INTRAMUSCULAR; INTRAVENOUS at 02:01

## 2025-01-15 RX ADMIN — ACETAMINOPHEN 1000 MG: 500 TABLET ORAL at 04:01

## 2025-01-15 RX ADMIN — POTASSIUM BICARBONATE 40 MEQ: 391 TABLET, EFFERVESCENT ORAL at 11:01

## 2025-01-15 RX ADMIN — METOPROLOL TARTRATE 25 MG: 25 TABLET, FILM COATED ORAL at 08:01

## 2025-01-15 RX ADMIN — SODIUM CHLORIDE, POTASSIUM CHLORIDE, SODIUM LACTATE AND CALCIUM CHLORIDE: 600; 310; 30; 20 INJECTION, SOLUTION INTRAVENOUS at 09:01

## 2025-01-15 RX ADMIN — AMLODIPINE BESYLATE 10 MG: 10 TABLET ORAL at 08:01

## 2025-01-15 NOTE — NURSING
Patient is ready for discharge. Patient stable alert and oriented. IVs removed. No complaints of pain. Discussed discharge plan. Reviewed medications, appointments, and answered questions with patient. Patient stated he will come back tomorrow to  medicine because he didn't have any cash on him today.

## 2025-01-15 NOTE — PLAN OF CARE
[facility] Stepdown  Discharge Final Note    Primary Care Provider: Kofi Padilla MD    Expected Discharge Date: 1/15/2025    Final Discharge Note (most recent)       Final Note - 01/15/25 1506          Final Note    Assessment Type Final Discharge Note     Anticipated Discharge Disposition Home or Self Care     Hospital Resources/Appts/Education Provided Appointments scheduled and added to AVS                   Maira Beasley LMSW [facility]  [id number]        Contact Info       Kofi Padilla MD   Specialty: Family Medicine   Relationship: PCP - General    [address] [place] [address]   Phone: [phone]       Next Steps: Go on 1/20/2025    Instructions: Follow up 1/20 at 330p

## 2025-01-15 NOTE — ASSESSMENT & PLAN NOTE
Suspect secondary to UTI v cannabinoid hyperemesis syndrome  Treat UTI  Recommend marijuana cessation  Ok for CLD, advance as tolerated  PRN antiemetics  If symptoms don't resolve, consider reglan

## 2025-01-15 NOTE — NURSING
Nurses Note -- 4 Eyes      1/14/2025   8:31 PM      Skin assessed during: Admit      [x] No Altered Skin Integrity Present    [x]Prevention Measures Documented      [] Yes- Altered Skin Integrity Present or Discovered   [] LDA Added if Not in Epic (Describe Wound)   [] New Altered Skin Integrity was Present on Admit and Documented in LDA   [] Wound Image Taken    Wound Care Consulted? No    Attending Nurse:  Jo Ann Orona RN     Second RN/Staff Member:  Pilar DOMINGUEZ      Patient admitted to [facility] . Patient arrived to floor from ED no evidence of distress; patient AAO x4 at this time. Patient placed on tele. Vital signs obtained. Patient voices no complaints at this time. Plan of care initiated with patient. Bed in lowest position, locked, SR up x2, call bell in reach.

## 2025-01-15 NOTE — PLAN OF CARE
[facility] Step[facility]  Initial Discharge Assessment       Primary Care Provider: Kofi Padilla MD    Admission Diagnosis: Tachycardia [R00.0]  Chest pain [R07.9]  Complicated UTI (urinary tract infection) [N39.0]    Admission Date: 1/14/2025  Expected Discharge Date: 1/17/2025    Transition of Care Barriers: None    Payor: BLUE CROSS BLUE SHIELD / Plan: BCBS ALL OUT OF STATE / Product Type: PPO /     Extended Emergency Contact Information  Primary Emergency Contact: Ale George  Address: [address]           [id number]           [place] [address] [facility]  Home Phone: [phone]  Relation: Spouse    Discharge Plan A: Home with family  Discharge Plan B: Home      [id number] - [place] [place] - [address]  [address]  [place] [address]  Phone: [phone] Fax: [phone]    [id number] - [place] - [address]  [address]  [place] [address]  Phone: [phone] Fax: [phone]      Initial Assessment (most recent)       Adult Discharge Assessment - 01/15/25 1306          Discharge Assessment    Assessment Type Discharge Planning Assessment     Confirmed/corrected address, phone number and insurance Yes     Confirmed Demographics Correct on Facesheet     Source of Information patient;health record     Does patient/caregiver understand observation status Yes     Communicated REMEDIOS with patient/caregiver Yes     Reason For Admission Severe sepsis     People in Home spouse;child(daphne), dependent     Facility Arrived From: Home     Do you expect to return to your current living situation? Yes     Do you have help at home or someone to help you manage your care at home? Yes     Who are your caregiver(s) and their phone number(s)? Ale George (spouse) [phone]     Prior to hospitilization cognitive status: Alert/Oriented     Current cognitive status: Alert/Oriented     Walking or Climbing Stairs Difficulty no     Dressing/Bathing Difficulty  no     Home Accessibility not wheelchair accessible;stairs to enter home     Equipment Currently Used at Home none     Readmission within 30 days? No     Patient currently being followed by outpatient case management? No     Do you currently have service(s) that help you manage your care at home? No     Do you take prescription medications? Yes     Do you have prescription coverage? Yes     Do you have any problems affording any of your prescribed medications? No     Is the patient taking medications as prescribed? yes     Who is going to help you get home at discharge? Ale George (spouse) [phone]     How do you get to doctors appointments? car, drives self     Are you on dialysis? No     Do you take coumadin? No     Discharge Plan A Home with family     Discharge Plan B Home     DME Needed Upon Discharge  none     Discharge Plan discussed with: Patient     Transition of Care Barriers None                   SW met with pt at bedside to discuss discharge planning.  Pt lives with his wife and two children (11, 4) in a second-floor apartement and is independent with ambulation and ADLs.  No DME, HH, dialysis, or coumadin.  PCP is Dr Kofi Padilla.  Pt will have transportation and assistance from his wife at discharge.  Discharge Plan A and Plan B have been determined by review of patient's clinical status, future medical and therapeutic needs, and coverage/benefits for post-acute care in coordination with multidisciplinary team members.  SW name and ext on whiteboard; discharge planning booklet provided.  Will continue to follow.      Maira Beasley LMSW  [facility]  [id number]

## 2025-01-15 NOTE — PLAN OF CARE
AAOX4,VSS,O2 sats > 93% on RA. Plan of care discussed with patient. Patient has no complaints of chest pain/SOB/palpitations. Pt ambulating INDEPENDENTLY , fall precautions in place,no falls/injuries through the shift.Discussed medications and care.Patient has no questions at this time.Pt resting comfortably with no acute distress.Call light within reach,bed at lowest position.

## 2025-01-15 NOTE — HPI
50yo male whose PMH includes HTN and t2dm presents c/o intractable N/V.  2 days ago he began having diarrhea and then developed N/V.  He notes subjective fever and chills.  No bloody stool or bloody emesis.  He has hx of cyclical vomiting.  He smokes marijuana but states that even when he does not smoke sometimes he has vomiting.  He presented today because he felt dehydrated.  He was tachycardic on arrival.  White count elevated at 29.  Cr 1.3, BL 0.7.  UA was obtained that showed concerns for infection.  He was given 2g rocephin and admitted to medicine for further management.

## 2025-01-15 NOTE — NURSING
I asked the pt when ws the last time he had an episode of vomiting he said 11 am this morning, I gave him some water he gulped the water and had an episode of vomiting I educated him to take sips of water or chew of ice chips. Pt is stable and gave verbal feedback that he understands.

## 2025-01-15 NOTE — NURSING
PT had a BP of 168/100 with a MAP of 125 and was asymptomatic, all other vitals stable notified MD Jewell no other orders given.

## 2025-01-15 NOTE — NURSING
PT BP was 178/78 with a map of 110 pt was complaining of a headache 6/10 gave tylenol and notified Hise,DO and mentioned that I think it is high due to his headache pain I was ordered to recheck BP in 30 minutes to see if BP goes down, after 30 minutes his BP went down to 148/67 with a map of 96, all other vitals are stable, pt said his headache feels better.    EMS

## 2025-01-17 LAB — BACTERIA UR CULT: ABNORMAL

## 2025-01-19 LAB
BACTERIA BLD CULT: NORMAL
BACTERIA BLD CULT: NORMAL

## 2025-01-20 DIAGNOSIS — N39.0 COMPLICATED UTI (URINARY TRACT INFECTION): Primary | ICD-10-CM

## 2025-01-20 RX ORDER — SULFAMETHOXAZOLE AND TRIMETHOPRIM 800; 160 MG/1; MG/1
1 TABLET ORAL 2 TIMES DAILY
Qty: 28 TABLET | Refills: 0 | Status: SHIPPED | OUTPATIENT
Start: 2025-01-20 | End: 2025-02-03

## 2025-01-20 NOTE — HOSPITAL COURSE
Admitted for N/V.  Found to have UTI.  Blood cultures were negative.  N/V resolved.  He expressed that he wished to go home and that he felt back to baseline.  Pt deemed appropriate for discharge; seen and examined prior to departure. Plan discussed with pt, who was agreeable and amenable; medications were discussed and reviewed, outpatient follow-up scheduled, ER precautions were given, all questions were answered to the pt's satisfaction, and was subsequently discharged.

## 2025-01-20 NOTE — DISCHARGE SUMMARY
[facility] - [facility] Medicine  Discharge Summary      Patient Name: Mateo George  MRN: [id number]  GUILLERMO: [id number]  Patient Class: OP- Observation  Admission Date: 1/14/2025  Hospital Length of Stay: 0 days  Discharge Date and Time: 1/15/2025  5:32 PM  Attending Physician: No att. providers found   Discharging Provider: Thomas Winston DO  Primary Care Provider: Kofi Padilla MD  [facility] Medicine Team: [facility] [place] [place] HOSP MED J Thomas Winston DO  Primary Care Team: [facility] J    HPI:   48yo male whose PMH includes HTN and t2dm presents c/o intractable N/V.  2 days ago he began having diarrhea and then developed N/V.  He notes subjective fever and chills.  No bloody stool or bloody emesis.  He has hx of cyclical vomiting.  He smokes marijuana but states that even when he does not smoke sometimes he has vomiting.  He presented today because he felt dehydrated.  He was tachycardic on arrival.  White count elevated at 29.  Cr 1.3, BL 0.7.  UA was obtained that showed concerns for infection.  He was given 2g rocephin and admitted to medicine for further management.    * No surgery found *      Hospital Course:   Admitted for N/V.  Found to have UTI.  Blood cultures were negative.  N/V resolved.  He expressed that he wished to go home and that he felt back to baseline.  Pt deemed appropriate for discharge; seen and examined prior to departure. Plan discussed with pt, who was agreeable and amenable; medications were discussed and reviewed, outpatient follow-up scheduled, ER precautions were given, all questions were answered to the pt's satisfaction, and was subsequently discharged.     Goals of Care Treatment Preferences:  Code Status: Full Code      SDOH Screening:  The patient was screened for utility difficulties, food insecurity, transport difficulties, housing insecurity, and interpersonal safety and there were no concerns identified this admission.     Consults:     * Severe sepsis  Secondary to  UTI  White count 29, HR >100  End organ damage as evidenced by OBI, Cr 1.3 on admission, BL ~0.7    UA with evidence of infection    Rocephin imitated in the ED, cont rocephin 2g q24h  Blood cultures pending  Monitor cultures, deescalate as appropriate  Cont IVF  Monitor UOP  Monitor renal function  Obtain renal US    OBI (acute kidney injury)  See plan for sepsis    Acute cystitis  See plan for sepsis    Type 2 diabetes mellitus  Hold metformin  SSI  Diabetic diet when not NPO  ACHS glucose checks    Essential hypertension  Uncontrolled  Resume home regimen as indicated  Hold HCTZ    Intractable nausea and vomiting  Suspect secondary to UTI v cannabinoid hyperemesis syndrome  Treat UTI  Recommend marijuana cessation  Ok for CLD, advance as tolerated  PRN antiemetics  If symptoms don't resolve, consider reglan      Final Active Diagnoses:    Diagnosis Date Noted POA    PRINCIPAL PROBLEM:  Severe sepsis [A41.9, R65.20] 01/14/2025 Yes    Acute cystitis [N30.00] 01/14/2025 Yes    OBI (acute kidney injury) [N17.9] 01/14/2025 Yes    Essential hypertension [I10] 10/21/2019 Yes    Type 2 diabetes mellitus [E11.9] 10/21/2019 Yes    Intractable nausea and vomiting [R11.2] 10/21/2019 Yes      Problems Resolved During this Admission:       Discharged Condition: good    Disposition: Home or Self Care    Follow Up:   Follow-up Information       Kofi Padilla MD. Go on 1/20/2025.    Specialty: Family Medicine  Why: Follow up 1/20 at 330p  Contact information:  [address] [place] [address]  [phone]                           Patient Instructions:      Diet diabetic     Notify your health care provider if you experience any of the following:  temperature >100.4     Notify your health care provider if you experience any of the following:  persistent dizziness, light-headedness, or visual disturbances     Activity as tolerated       Significant Diagnostic Studies: N/A    Pending Diagnostic Studies:       None            Medications:  Reconciled Home Medications:      Medication List        START taking these medications      cefpodoxime 100 MG tablet  Commonly known as: VANTIN  Take 1 tablet (100 mg total) by mouth every 12 (twelve) hours. for 12 days            CONTINUE taking these medications      (MAGIC MOUTHWASH) 1:1:1 BENADRYL 12.5MG/5ML LIQ, ALUMINUM & MAGNESIUM  Swish and spit 10 mLs every 4 (four) hours as needed. for mouth sores     amLODIPine 10 MG tablet  Commonly known as: NORVASC  Take 10 mg by mouth once daily.     atorvastatin 40 MG tablet  Commonly known as: LIPITOR  TAKE BY MOUTH 1 TABLET AT BEDTIME     cetirizine 10 MG tablet  Commonly known as: ZYRTEC  Take 1 tablet (10 mg total) by mouth once daily.     EScitalopram oxalate 10 MG tablet  Commonly known as: LEXAPRO  Take 10 mg by mouth once daily.     GI cocktail antac/dicyc/lidoc  Take 50 mLs by mouth 3 (three) times daily as needed.     hydroCHLOROthiazide 12.5 mg capsule  Commonly known as: MICROZIDE  Take 12.5 mg by mouth once daily.     metFORMIN 1000 MG tablet  Commonly known as: GLUCOPHAGE  Hold until nausea and diarrhea resolved     methocarbamoL 750 MG Tab  Commonly known as: ROBAXIN  TAKE BY MOUTH 1 TABLET 3 TIMES A DAY AS NEEDED     nebivoloL 5 MG Tab  Commonly known as: BYSTOLIC  Take 10 mg by mouth once daily.     ondansetron 4 MG Tbdl  Commonly known as: ZOFRAN-ODT  Take 1 tablet (4 mg total) by mouth every 8 (eight) hours as needed.     SALONPAS (CAPSAICIN-MENTHOL) 0.025-1.25 % Ptmd  Generic drug: capsaicin-menthol  Apply 1 packet topically every 12 (twelve) hours.            STOP taking these medications      clonazePAM 1 MG tablet  Commonly known as: KlonoPIN     fluticasone propionate 50 mcg/actuation nasal spray  Commonly known as: FLONASE ALLERGY RELIEF     ondansetron 8 MG tablet  Commonly known as: ZOFRAN     promethazine-dextromethorphan 6.25-15 mg/5 mL Syrp  Commonly known as: PROMETHAZINE-DM              Indwelling Lines/Drains at time  of discharge:   Lines/Drains/Airways       None                   Time spent on the discharge of patient: >35 minutes         Thomas Winston DO  Department of Hospital Medicine  [facility] - [facility] Stepdown